# Patient Record
Sex: MALE | Race: WHITE | Employment: OTHER | URBAN - METROPOLITAN AREA
[De-identification: names, ages, dates, MRNs, and addresses within clinical notes are randomized per-mention and may not be internally consistent; named-entity substitution may affect disease eponyms.]

---

## 2017-03-22 ENCOUNTER — OFFICE VISIT (OUTPATIENT)
Dept: PODIATRY | Facility: CLINIC | Age: 82
End: 2017-03-22
Payer: MEDICARE

## 2017-03-22 VITALS — HEART RATE: 78 BPM | HEIGHT: 68 IN | WEIGHT: 192 LBS | BODY MASS INDEX: 29.1 KG/M2

## 2017-03-22 DIAGNOSIS — M79.671 PAIN IN BOTH FEET: Primary | ICD-10-CM

## 2017-03-22 DIAGNOSIS — M20.41 HAMMER TOES OF BOTH FEET: ICD-10-CM

## 2017-03-22 DIAGNOSIS — L60.0 INGROWN NAIL OF GREAT TOE OF RIGHT FOOT: ICD-10-CM

## 2017-03-22 DIAGNOSIS — M20.42 HAMMER TOES OF BOTH FEET: ICD-10-CM

## 2017-03-22 DIAGNOSIS — L60.0 INGROWING NAIL, LEFT GREAT TOE: ICD-10-CM

## 2017-03-22 DIAGNOSIS — I73.9 PVD (PERIPHERAL VASCULAR DISEASE) (H): ICD-10-CM

## 2017-03-22 DIAGNOSIS — M79.672 PAIN IN BOTH FEET: Primary | ICD-10-CM

## 2017-03-22 PROCEDURE — 11732 AVLSN NAIL PLATE SIMPLE EACH: CPT | Mod: T2 | Performed by: PODIATRIST

## 2017-03-22 PROCEDURE — 99203 OFFICE O/P NEW LOW 30 MIN: CPT | Mod: 25 | Performed by: PODIATRIST

## 2017-03-22 PROCEDURE — 11730 AVULSION NAIL PLATE SIMPLE 1: CPT | Mod: TA | Performed by: PODIATRIST

## 2017-03-22 RX ORDER — SILVER SULFADIAZINE 10 MG/G
CREAM TOPICAL 2 TIMES DAILY
Qty: 25 G | Refills: 0 | Status: SHIPPED | OUTPATIENT
Start: 2017-03-22 | End: 2017-07-11

## 2017-03-22 NOTE — LETTER
3/22/2017       RE: Eddie Stephens  [address]  [facility] [address]           Dear Colleague,    Thank you for referring your patient, Eddie Stephens, to the [facility]. Please see a copy of my visit note below.    PATIENT HISTORY:  Eddie Stephens is a 89 year old male who presents to clinic for painful ingrown nails both great toes. Notes he has them for years. Here with wife and daughter. Pain is 6/10. Denies fever, chills. Would like to know what can be done for them.     Review of Systems:  Patient denies fever, chills, rash, wound, stiffness, limping, numbness, weakness, heart burn, blood in stool, chest pain with activity, calf pain when walking, shortness of breath with activity, chronic cough, easy bleeding/bruising, swelling of ankles, excessive thirst, fatigue, depression, anxiety.      PAST MEDICAL HISTORY: No past medical history on file.     PAST SURGICAL HISTORY: No past surgical history on file.     MEDICATIONS:   Current Outpatient Prescriptions:      silver sulfADIAZINE (SILVADENE) 1 % cream, Apply topically 2 times daily Apply to toes 2x a day after foot soaks, Disp: 25 g, Rfl: 0     lidocaine (XYLOCAINE) 2 % topical gel, Apply topically as needed for moderate pain, Disp: 30 mL, Rfl: 0     beta carotene 40955 UNIT capsule, Take 10,000 Units by mouth daily, Disp: , Rfl:      Selenium 200 MCG CAPS, Take 1 capsule by mouth every morning, Disp: , Rfl:      MAGNESIUM GLYCINATE PLUS PO, , Disp: , Rfl:      Multiple Vitamins-Minerals (PRESERVISION AREDS) CAPS, , Disp: , Rfl:      Bilberry 60 MG CAPS, , Disp: , Rfl:      LUTEIN-ZEAXANTHIN PO, , Disp: , Rfl:      finasteride (PROSCAR) 5 MG tablet, Take 5 mg by mouth daily, Disp: , Rfl:      alfuzosin (UROXATRAL) 10 MG 24 hr tablet, Take 10 mg by mouth daily, Disp: , Rfl:      saw palmetto 450 MG CAPS capsule, Take 450 mg by mouth daily, Disp: , Rfl:      pramipexole (MIRAPEX) 0.125 MG tablet, Take 0.25 mg by mouth daily (with  "dinner), Disp: , Rfl:      Ferrous Gluconate 324 (37.5 FE) MG TABS, Take 1 tablet by mouth 2 times daily, Disp: , Rfl:      traMADol (ULTRAM) 50 MG tablet, Take by mouth At Bedtime, Disp: , Rfl:      Calcium Citrate-Vitamin D (CALCIUM CITRATE + D PO), Take by mouth 2 times daily, Disp: , Rfl:      flunisolide (NASALIDE) 25 MCG/ACT (0.025%) SOLN spray, Spray 2 sprays into both nostrils every 12 hours, Disp: , Rfl:      albuterol (PROAIR HFA/PROVENTIL HFA/VENTOLIN HFA) 108 (90 BASE) MCG/ACT Inhaler, Inhale 2 puffs into the lungs every 6 hours, Disp: , Rfl:      metroNIDAZOLE (METROGEL) 0.75 % topical gel, Apply topically 2 times daily, Disp: , Rfl:      minocycline (MINOCIN/DYNACIN) 50 MG capsule, Take 50 mg by mouth daily as needed, Disp: , Rfl:      ALLERGIES:    Allergies   Allergen Reactions     Ambien [Zolpidem]      Detrol [Tolterodine]      Erythromycin      Gabapentin      Keflex [Cephalexin]      Klonopin [Clonazepam]      Oxybutynin      Terazosin         SOCIAL HISTORY:   Social History     Social History     Marital status:      Spouse name: N/A     Number of children: N/A     Years of education: N/A     Occupational History     Not on file.     Social History Main Topics     Smoking status: Never Smoker     Smokeless tobacco: Not on file     Alcohol use Not on file     Drug use: Not on file     Sexual activity: Not on file     Other Topics Concern     Not on file     Social History Narrative        FAMILY HISTORY: No family history on file.     EXAM:Vitals: Pulse 78  Ht 1.727 m (5' 8\")  Wt 87.1 kg (192 lb)  BMI 29.19 kg/m2  BMI= Body mass index is 29.19 kg/(m^2).    General appearance: Patient is alert and fully cooperative with history & exam.  No sign of distress is noted during the visit.     Psychiatric: Affect is pleasant & appropriate.  Patient appears motivated to improve health.     Respiratory: Breathing is regular & unlabored while sitting.     HEENT: Hearing is intact to spoken word.  " Speech is clear.  No gross evidence of visual impairment that would impact ambulation.     Dermatologic: both borders of both great toenails are incurvated. Pain on palpation. Localized redness.      Vascular: DP & PT pulses are faintly palpable bilaterally.  No significant edema or varicosities noted.  CFT and skin temperature is normal to both lower extremities.     Neurologic: Lower extremity sensation is intact to light touch.  No evidence of weakness or contracture in the lower extremities.  No evidence of neuropathy.     Musculoskeletal: Patient is ambulatory without assistive device or brace.  Toes 2-5 are semi rigidly contractred.      ASSESSMENT:    Pain in both feet  Ingrowing nail, left great toe  Ingrown nail of great toe of right foot  PVD (peripheral vascular disease) (H)       PLAN:  Reviewed patient's chart in [facility]. The potential causes and nature of an ingrown toenail were discussed with the patient.  We reviewed the natural history/prognosis of the condition and potential risks if no treatment is provided.      Treatment options discussed included conservative management (oral antibiotics, soaking of foot, adequate width shoes)  as well as surgical management (partial or total nail removal).  The pros and cons of both forms of treatment were reviewed.      After thorough discussion and answering all questions, the patient elected to have the borders removed. Will soak the foot 2x a day for 2 weeks and apply antibiotic ointment and a bandage. Discussed that given his fainter pulses, would not recommend the permanent procedure as I do no want to cause a nonhealing wound.     Procedure 1: After verbal consent, the right big toe was anesthetized with 5cc's of 1% lidocaine plain. A tourniquet was applied to the toe. The medial and lateral borders were then raised from the nail bed and then cut the length of the nail.  The offending nail borders were then removed.    Bacitracin was applied to the nail  bed.  The tourniquet was removed.  Bandage was applied to the toe.  The patient tolerated the procedure and anesthesia well.       Procedure 2 : same procedure on left great toe.     Kami Chen DPM, Podiatry/Foot and Ankle Surgery    Weight management plan: Patient was referred to their PCP to discuss a diet and exercise plan.      Again, thank you for allowing me to participate in the care of your patient.        Sincerely,    Kami Chen DPM, Podiatry/Foot and Ankle Surgery

## 2017-03-22 NOTE — PROGRESS NOTES
PATIENT HISTORY:  Eddie Stephens is a 89 year old male who presents to clinic for painful ingrown nails both great toes. Notes he has them for years. Here with wife and daughter. Pain is 6/10. Denies fever, chills. Would like to know what can be done for them.     Review of Systems:  Patient denies fever, chills, rash, wound, stiffness, limping, numbness, weakness, heart burn, blood in stool, chest pain with activity, calf pain when walking, shortness of breath with activity, chronic cough, easy bleeding/bruising, swelling of ankles, excessive thirst, fatigue, depression, anxiety.      PAST MEDICAL HISTORY: No past medical history on file.     PAST SURGICAL HISTORY: No past surgical history on file.     MEDICATIONS:   Current Outpatient Prescriptions:      silver sulfADIAZINE (SILVADENE) 1 % cream, Apply topically 2 times daily Apply to toes 2x a day after foot soaks, Disp: 25 g, Rfl: 0     lidocaine (XYLOCAINE) 2 % topical gel, Apply topically as needed for moderate pain, Disp: 30 mL, Rfl: 0     beta carotene 90406 UNIT capsule, Take 10,000 Units by mouth daily, Disp: , Rfl:      Selenium 200 MCG CAPS, Take 1 capsule by mouth every morning, Disp: , Rfl:      MAGNESIUM GLYCINATE PLUS PO, , Disp: , Rfl:      Multiple Vitamins-Minerals (PRESERVISION AREDS) CAPS, , Disp: , Rfl:      Bilberry 60 MG CAPS, , Disp: , Rfl:      LUTEIN-ZEAXANTHIN PO, , Disp: , Rfl:      finasteride (PROSCAR) 5 MG tablet, Take 5 mg by mouth daily, Disp: , Rfl:      alfuzosin (UROXATRAL) 10 MG 24 hr tablet, Take 10 mg by mouth daily, Disp: , Rfl:      saw palmetto 450 MG CAPS capsule, Take 450 mg by mouth daily, Disp: , Rfl:      pramipexole (MIRAPEX) 0.125 MG tablet, Take 0.25 mg by mouth daily (with dinner), Disp: , Rfl:      Ferrous Gluconate 324 (37.5 FE) MG TABS, Take 1 tablet by mouth 2 times daily, Disp: , Rfl:      traMADol (ULTRAM) 50 MG tablet, Take by mouth At Bedtime, Disp: , Rfl:      Calcium Citrate-Vitamin D (CALCIUM CITRATE + D  "PO), Take by mouth 2 times daily, Disp: , Rfl:      flunisolide (NASALIDE) 25 MCG/ACT (0.025%) SOLN spray, Spray 2 sprays into both nostrils every 12 hours, Disp: , Rfl:      albuterol (PROAIR HFA/PROVENTIL HFA/VENTOLIN HFA) 108 (90 BASE) MCG/ACT Inhaler, Inhale 2 puffs into the lungs every 6 hours, Disp: , Rfl:      metroNIDAZOLE (METROGEL) 0.75 % topical gel, Apply topically 2 times daily, Disp: , Rfl:      minocycline (MINOCIN/DYNACIN) 50 MG capsule, Take 50 mg by mouth daily as needed, Disp: , Rfl:      ALLERGIES:    Allergies   Allergen Reactions     Ambien [Zolpidem]      Detrol [Tolterodine]      Erythromycin      Gabapentin      Keflex [Cephalexin]      Klonopin [Clonazepam]      Oxybutynin      Terazosin         SOCIAL HISTORY:   Social History     Social History     Marital status:      Spouse name: N/A     Number of children: N/A     Years of education: N/A     Occupational History     Not on file.     Social History Main Topics     Smoking status: Never Smoker     Smokeless tobacco: Not on file     Alcohol use Not on file     Drug use: Not on file     Sexual activity: Not on file     Other Topics Concern     Not on file     Social History Narrative        FAMILY HISTORY: No family history on file.     EXAM:Vitals: Pulse 78  Ht 1.727 m (5' 8\")  Wt 87.1 kg (192 lb)  BMI 29.19 kg/m2  BMI= Body mass index is 29.19 kg/(m^2).    General appearance: Patient is alert and fully cooperative with history & exam.  No sign of distress is noted during the visit.     Psychiatric: Affect is pleasant & appropriate.  Patient appears motivated to improve health.     Respiratory: Breathing is regular & unlabored while sitting.     HEENT: Hearing is intact to spoken word.  Speech is clear.  No gross evidence of visual impairment that would impact ambulation.     Dermatologic: both borders of both great toenails are incurvated. Pain on palpation. Localized redness.      Vascular: DP & PT pulses are faintly palpable " bilaterally.  No significant edema or varicosities noted.  CFT and skin temperature is normal to both lower extremities.     Neurologic: Lower extremity sensation is intact to light touch.  No evidence of weakness or contracture in the lower extremities.  No evidence of neuropathy.     Musculoskeletal: Patient is ambulatory without assistive device or brace.  Toes 2-5 are semi rigidly contractred.      ASSESSMENT:    Pain in both feet  Ingrowing nail, left great toe  Ingrown nail of great toe of right foot  PVD (peripheral vascular disease) (H)       PLAN:  Reviewed patient's chart in [facility]. The potential causes and nature of an ingrown toenail were discussed with the patient.  We reviewed the natural history/prognosis of the condition and potential risks if no treatment is provided.      Treatment options discussed included conservative management (oral antibiotics, soaking of foot, adequate width shoes)  as well as surgical management (partial or total nail removal).  The pros and cons of both forms of treatment were reviewed.      After thorough discussion and answering all questions, the patient elected to have the borders removed. Will soak the foot 2x a day for 2 weeks and apply antibiotic ointment and a bandage. Discussed that given his fainter pulses, would not recommend the permanent procedure as I do no want to cause a nonhealing wound.     Procedure 1: After verbal consent, the right big toe was anesthetized with 5cc's of 1% lidocaine plain. A tourniquet was applied to the toe. The medial and lateral borders were then raised from the nail bed and then cut the length of the nail.  The offending nail borders were then removed.    Bacitracin was applied to the nail bed.  The tourniquet was removed.  Bandage was applied to the toe.  The patient tolerated the procedure and anesthesia well.       Procedure 2 : same procedure on left great toe.     Kami Chen DPM, Podiatry/Foot and Ankle Surgery    Weight  management plan: Patient was referred to their PCP to discuss a diet and exercise plan.

## 2017-03-22 NOTE — NURSING NOTE
"Chief Complaint   Patient presents with     Ingrown Toenail     Bl hallux nails, a podiatrist came to his home and it has hurt since       Initial Pulse 78  Ht 5' 8\" (1.727 m)  Wt 192 lb (87.1 kg)  BMI 29.19 kg/m2 Estimated body mass index is 29.19 kg/(m^2) as calculated from the following:    Height as of this encounter: 5' 8\" (1.727 m).    Weight as of this encounter: 192 lb (87.1 kg).  Medication Reconciliation: complete  "

## 2017-03-22 NOTE — PATIENT INSTRUCTIONS
DR. BUCIO'S CLINIC SCHEDULE     [facility]  [address] [place] [address]  P: [phone]  F: [phone] [facility]  [address] [place] [address]  P: [phone]  F: [phone] [facility]  [address] [place] [address]  P: [phone]  F: [phone]   FRIDAY AM FRIDAY PM SURGERY   [facility]     [facility]  [address]  [facility] [place] [address]  P: [phone] [facility]  [address]  [place] [address]  P: [phone]  F: [phone] Surgery [phone]   Appointment [phone] General After Hours:  [phone] Patient [phone]     INGROWN TOENAILS  When a toenail is ingrown, it is curved and grows into the skin, usually at the nail borders (the sides of the nail). This  digging in  of the nail irritates the skin, often creating pain, redness, swelling, and warmth in the toe.  If an ingrown nail causes a break in the skin, bacteria may enter and cause an infection in the area, which is often marked by drainage and a foul odor. However, even if the toe isn t painful, red, swollen, or warm, a nail that curves downward into the skin can progress to an infection.  CAUSES:  Heredity: In many people, the tendency for ingrown toenails is inherited.   Trauma: Sometimes an ingrown toenail is the result of trauma, such as stubbing your toe, having an object fall on your toe, or engaging in activities that involve repeated pressure on the toes, such as kicking or running.   Improper Trimming:  The most common cause of ingrown toenails is cutting your nails too short. This encourages the skin next to the nail to fold over the nail.   Improperly Sized Footwear: Ingrown toenails can result from wearing socks and shoes that are tight or short.   Nail Conditions: Ingrown toenails can be caused by nail problems, such as  fungal infections or losing a nail due to trauma.   TREATMENT: Sometimes initial treatment for ingrown toenails can be safely performed at home. However, home treatment is strongly discouraged if an infection is suspected, or for those who have medical conditions that put feet at high risk, such as diabetes, nerve damage in the foot, or poor circulation.  Home care: If you don t have an infection or any of the above medical conditions, you can soak your foot in room-temperature water (adding Epsom s salt may be recommended by your doctor), and gently massage the side of the nail fold to help reduce the inflammation.  Avoid attempting  bathroom surgery.  Repeated cutting of the nail can cause the condition to worsen over time. If your symptoms fail to improve, it s time to see a foot and ankle surgeon.  Physician care: After examining the toe, the foot and ankle surgeon will select the treatment best suited for you. If an infection is present, an oral antibiotic may be prescribed.  Sometimes a minor surgical procedure, often performed in the office, will ease the pain and remove the offending nail. After applying a local anesthetic, the doctor removes part of the nail s side border. Some nails may become ingrown again, requiring removal of the nail root.  Following the nail procedure, a light bandage will be applied. Most people experience very little pain after surgery and may resume normal activity the next day. If your surgeon has prescribed an oral antibiotic, be sure to take all the medication, even if your symptoms have improved.  PREVENTION:  Proper Trimming: Cut toenails in a fairly straight line, and don t cut them too short. You should be able to get your fingernail under the sides and end of the nail.   Well-fitting Footwear: Don t wear shoes that are short or tight in the toe area. Avoid shoes that are loose, because they too cause pressure on the toes, especially when running or walking briskly.      INGROWN TOENAIL POSTOP CARE    Go directly home and elevate the affected foot on one or two pillows for the remainder of the day/evening if possible. Your toe may stay numb anywhere from 2-8 hours.     Take Tylenol, ibuprofen or another anti-inflammatory as needed for pain.     Take antibiotic if that has been prescribed. Finish the entire prescribed antibiotic even if your symptoms have improved.     The evening of the procedure, soak/wash the affected area in warm water (you may add Epsom salt) for 5 to 10 minutes. Do this twice a day for 2-4 weeks (6-8 weeks if you had phenol) (you may count showering/bathing as one soak).  After soaks, pat the area dry and then allow to airdry for a few minutes. Apply antibiotic ointment to the area and cover with 2 X 2 gauze and paper tape or band-aid.    You may pursue everyday activities as tolerated with either an open toe shoe or cut-out shoe as needed or you may wear regular shoes if no pain is noted.    Watch for any signs and symptoms of infection such as: redness, red streaks going up the foot/leg, swelling, pus or foul odor. Those that have had the phenol procedure, the toe will drain longer and will look like it is infected because it is a chemical burn.      Please call with questions.      FOOT CARE NURSES  If you are interested in having a foot care nurse come out to your   home, please call one of these contacts for more information:  [facility]  [phone] [facility]  [phone]   [facility]  [phone]  [place] [phone]  [place]   [facility]  [phone]  At [facility] [phone]           Body Mass Index (BMI)  Many things can cause foot and ankle problems. Foot structure, activity level, foot mechanics and injuries are common causes of pain.    One very important issue that often goes unmentioned, is body weight.  Extra weight can cause increased  stress on muscles, ligaments, bones and tendons.  Sometimes just a few extra pounds is all it takes to put one over her/his threshold.   Without reducing that stress, it can be difficult to alleviate pain.      Some people are uncomfortable addressing this issue, but we feel it is important for you to think about it.  As Foot &  Ankle specialists, our job is addressing the lower extremity problem and possible causes.     Regarding extra body weight, we encourage patients to discuss diet and weight management plans with their primary care doctors.  It is this team approach that gives you the best opportunity for pain relief and getting you back on your feet.

## 2017-03-22 NOTE — MR AVS SNAPSHOT
After Visit Summary   3/22/2017    Eddie Stephens    MRN: [id number]           Patient Information     Date Of Birth          1922        Visit Information        Provider Department      3/22/2017 10:30 AM Kami Chen DPM, Podiatry/Foot and Ankle Surgery [facility]        Today's Diagnoses     Pain in both feet    -  1    Ingrowing nail, left great toe        Ingrown nail of great toe of right foot          Care Instructions    DR. CHEN'S CLINIC SCHEDULE     [facility]  [address] [place] [address]  P: [phone]  F: [phone] [facility]  [address] [place] [address]  P: [phone]  F: [phone] [facility]  [address] [place] [address]  P: [phone]  F: [phone]   FRIDAY AM FRIDAY PM SURGERY   [facility]     [facility]  [address]  [place] [address]  P: [phone] [facility]  [address]  [place] [address]  P: [phone]  F: [phone] Surgery [phone]   Appointment [phone] General After Hours:  [phone] Patient [phone]     INGROWN TOENAILS  When a toenail is ingrown, it is curved and grows into the skin, usually at the nail borders (the sides of the nail). This  digging in  of the nail irritates the skin, often creating pain, redness, swelling, and warmth in the toe.  If an ingrown nail causes a break in the skin, bacteria may enter and cause an infection in the area, which is often marked by drainage and a foul odor. However, even if the toe isn t painful, red, swollen, or warm, a nail that curves downward into the skin can progress to an infection.  CAUSES:  Heredity: In many people, the tendency for ingrown toenails is inherited.   Trauma: Sometimes an ingrown toenail is the result of trauma, such as stubbing your toe, having an  object fall on your toe, or engaging in activities that involve repeated pressure on the toes, such as kicking or running.   Improper Trimming:  The most common cause of ingrown toenails is cutting your nails too short. This encourages the skin next to the nail to fold over the nail.   Improperly Sized Footwear: Ingrown toenails can result from wearing socks and shoes that are tight or short.   Nail Conditions: Ingrown toenails can be caused by nail problems, such as fungal infections or losing a nail due to trauma.   TREATMENT: Sometimes initial treatment for ingrown toenails can be safely performed at home. However, home treatment is strongly discouraged if an infection is suspected, or for those who have medical conditions that put feet at high risk, such as diabetes, nerve damage in the foot, or poor circulation.  Home care: If you don t have an infection or any of the above medical conditions, you can soak your foot in room-temperature water (adding Epsom s salt may be recommended by your doctor), and gently massage the side of the nail fold to help reduce the inflammation.  Avoid attempting  bathroom surgery.  Repeated cutting of the nail can cause the condition to worsen over time. If your symptoms fail to improve, it s time to see a foot and ankle surgeon.  Physician care: After examining the toe, the foot and ankle surgeon will select the treatment best suited for you. If an infection is present, an oral antibiotic may be prescribed.  Sometimes a minor surgical procedure, often performed in the office, will ease the pain and remove the offending nail. After applying a local anesthetic, the doctor removes part of the nail s side border. Some nails may become ingrown again, requiring removal of the nail root.  Following the nail procedure, a light bandage will be applied. Most people experience very little pain after surgery and may resume normal activity the next day. If your surgeon has prescribed an oral  antibiotic, be sure to take all the medication, even if your symptoms have improved.  PREVENTION:  Proper Trimming: Cut toenails in a fairly straight line, and don t cut them too short. You should be able to get your fingernail under the sides and end of the nail.   Well-fitting Footwear: Don t wear shoes that are short or tight in the toe area. Avoid shoes that are loose, because they too cause pressure on the toes, especially when running or walking briskly.     INGROWN TOENAIL POSTOP CARE    Go directly home and elevate the affected foot on one or two pillows for the remainder of the day/evening if possible. Your toe may stay numb anywhere from 2-8 hours.     Take Tylenol, ibuprofen or another anti-inflammatory as needed for pain.     Take antibiotic if that has been prescribed. Finish the entire prescribed antibiotic even if your symptoms have improved.     The evening of the procedure, soak/wash the affected area in warm water (you may add Epsom salt) for 5 to 10 minutes. Do this twice a day for 2-4 weeks (6-8 weeks if you had phenol) (you may count showering/bathing as one soak).  After soaks, pat the area dry and then allow to airdry for a few minutes. Apply antibiotic ointment to the area and cover with 2 X 2 gauze and paper tape or band-aid.    You may pursue everyday activities as tolerated with either an open toe shoe or cut-out shoe as needed or you may wear regular shoes if no pain is noted.    Watch for any signs and symptoms of infection such as: redness, red streaks going up the foot/leg, swelling, pus or foul odor. Those that have had the phenol procedure, the toe will drain longer and will look like it is infected because it is a chemical burn.      Please call with questions.      FOOT CARE NURSES  If you are interested in having a foot care nurse come out to your   home, please call one of these contacts for more information:  [facility]  [phone] [facility]  [phone]    [facility]  [phone]  [facility] [phone]  [facility]  [phone]  At [facility] [phone]           Body Mass Index (BMI)  Many things can cause foot and ankle problems. Foot structure, activity level, foot mechanics and injuries are common causes of pain.    One very important issue that often goes unmentioned, is body weight.  Extra weight can cause increased stress on muscles, ligaments, bones and tendons.  Sometimes just a few extra pounds is all it takes to put one over her/his threshold.   Without reducing that stress, it can be difficult to alleviate pain.      Some people are uncomfortable addressing this issue, but we feel it is important for you to think about it.  As Foot &  Ankle specialists, our job is addressing the lower extremity problem and possible causes.     Regarding extra body weight, we encourage patients to discuss diet and weight management plans with their primary care doctors.  It is this team approach that gives you the best opportunity for pain relief and getting you back on your feet.                Follow-ups after your visit        Who to contact     If you have questions or need follow up information about today's clinic visit or your schedule please contact [facility] directly at [phone].  Normal or non-critical lab and imaging results will be communicated to you by MyChart, letter or phone within 4 business days after the clinic has received the results. If you do not hear from us within 7 days, please contact the clinic through [facility] or phone. If you have a critical or abnormal lab result, we will notify you by phone as soon as possible.  Submit refill requests through [facility] or call your pharmacy and they will forward the refill request to us. Please allow 3 business days for your refill to be completed.          Additional Information About  "Your Visit        MyChart Information     [facility] lets you send messages to your doctor, view your test results, renew your prescriptions, schedule appointments and more. To sign up, go to [email or web address] . Click on \"Log in\" on the left side of the screen, which will take you to the Welcome page. Then click on \"Sign up Now\" on the right side of the page.     You will be asked to enter the access code listed below, as well as some personal information. Please follow the directions to create your username and password.     Your access code is: [email or web address]  Expires: 2017 10:58 AM     Your access code will  in 90 days. If you need help or a new code, please call your [facility] clinic or [phone].        Care EveryWhere ID     This is your Care EveryWhere ID. This could be used by other organizations to access your [facility] medical records  [id number]        Your Vitals Were     Pulse Height BMI (Body Mass Index)             78 1.727 m (5' 8\") 29.19 kg/m2          Blood Pressure from Last 3 Encounters:   16 110/70    Weight from Last 3 Encounters:   17 87.1 kg (192 lb)   16 85.3 kg (188 lb)              Today, you had the following     No orders found for display       Primary Care Provider Office Phone # Fax #    Thom Cruz PA-C [phone]       [facility] [address]  [facility] [address]        Thank you!     Thank you for choosing [facility]  for your care. Our goal is always to provide you with excellent care. Hearing back from our patients is one way we can continue to improve our services. Please take a few minutes to complete the written survey that you may receive in the mail after your visit with us. Thank you!             Your Updated Medication List - Protect others around you: Learn how to safely use, store and throw away your medicines at www.disposemymeds.org.          This list is accurate as of: " 3/22/17 11:20 AM.  Always use your most recent med list.                   Brand Name Dispense Instructions for use    albuterol 108 (90 BASE) MCG/ACT Inhaler    PROAIR HFA/PROVENTIL HFA/VENTOLIN HFA     Inhale 2 puffs into the lungs every 6 hours       alfuzosin 10 MG 24 hr tablet    UROXATRAL     Take 10 mg by mouth daily       beta carotene 81622 UNIT capsule      Take 10,000 Units by mouth daily       Bilberry 60 MG Caps          CALCIUM CITRATE + D PO      Take by mouth 2 times daily       Ferrous Gluconate 324 (37.5 FE) MG Tabs      Take 1 tablet by mouth 2 times daily       finasteride 5 MG tablet    PROSCAR     Take 5 mg by mouth daily       flunisolide 25 MCG/ACT (0.025%) Soln spray    NASALIDE     Spray 2 sprays into both nostrils every 12 hours       LUTEIN-ZEAXANTHIN PO          MAGNESIUM GLYCINATE PLUS PO          metroNIDAZOLE 0.75 % topical gel    METROGEL     Apply topically 2 times daily       minocycline 50 MG capsule    MINOCIN/DYNACIN     Take 50 mg by mouth daily as needed       MIRAPEX 0.125 MG tablet   Generic drug:  pramipexole      Take 0.25 mg by mouth daily (with dinner)       PRESERVISION AREDS Caps          saw palmetto 450 MG Caps capsule      Take 450 mg by mouth daily       Selenium 200 MCG Caps      Take 1 capsule by mouth every morning       traMADol 50 MG tablet    ULTRAM     Take by mouth At Bedtime

## 2017-07-11 ENCOUNTER — OFFICE VISIT (OUTPATIENT)
Dept: FAMILY MEDICINE | Facility: CLINIC | Age: 82
End: 2017-07-11
Payer: MEDICARE

## 2017-07-11 VITALS
HEART RATE: 73 BPM | OXYGEN SATURATION: 93 % | SYSTOLIC BLOOD PRESSURE: 118 MMHG | TEMPERATURE: 98.1 F | WEIGHT: 198.8 LBS | BODY MASS INDEX: 30.13 KG/M2 | DIASTOLIC BLOOD PRESSURE: 66 MMHG | HEIGHT: 68 IN

## 2017-07-11 DIAGNOSIS — N40.1 BENIGN PROSTATIC HYPERPLASIA WITH URINARY FREQUENCY: ICD-10-CM

## 2017-07-11 DIAGNOSIS — R35.0 BENIGN PROSTATIC HYPERPLASIA WITH URINARY FREQUENCY: ICD-10-CM

## 2017-07-11 DIAGNOSIS — M19.011 PRIMARY OSTEOARTHRITIS OF BOTH SHOULDERS: Primary | ICD-10-CM

## 2017-07-11 DIAGNOSIS — M19.012 PRIMARY OSTEOARTHRITIS OF BOTH SHOULDERS: Primary | ICD-10-CM

## 2017-07-11 PROCEDURE — 99203 OFFICE O/P NEW LOW 30 MIN: CPT | Performed by: PHYSICIAN ASSISTANT

## 2017-07-11 NOTE — PROGRESS NOTES
"  SUBJECTIVE:                                                    Eddie Stephens is a 89 year old male who presents to clinic today for the following health issues:    This gentleman is a 89yo new to this clinic and [facility]  He is establishing care but would like to discuss two specific concerns today    SHOULDER  Would like to discuss when/where they can go to get cortisone injections in left shoulder.  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   He is having frequent urination at night - about every 2 hours.   Does have a hx of BPH and has seen urology previously, not for a few years  The nocturia has been a problem for the past few years  Is using CPAP  Drinks one to two cups coffee daily (decaf); no etoh, monitors other liquid intake    [facility]      Problem list and histories reviewed & adjusted, as indicated.  Additional history: as documented    Patient Active Problem List   Diagnosis   (none) - all problems resolved or deleted     History reviewed. No pertinent surgical history.    Social History   Substance Use Topics     Smoking status: Former Smoker     Smokeless tobacco: Not on file     Alcohol use Not on file     Family History   Problem Relation Age of Onset     Coronary Artery Disease Mother      Hypertension Mother            Reviewed and updated as needed this visit by clinical staff       Reviewed and updated as needed this visit by Provider         ROS:  Constitutional, HEENT, cardiovascular, pulmonary, gi and gu systems are negative, except as otherwise noted.    OBJECTIVE:     BP " "118/66  Pulse 73  Temp 98.1  F (36.7  C) (Oral)  Ht 5' 8\" (1.727 m)  Wt 198 lb 12.8 oz (90.2 kg)  SpO2 93%  BMI 30.23 kg/m2  Body mass index is 30.23 kg/(m^2).  GENERAL: healthy, alert and no distress  RESP: lungs clear to auscultation - no rales, rhonchi or wheezes  CV: regular rates and rhythm  RECTAL/ (male): deferred today  MS: Left shoulder shows limited ROM 2/2 pain, especially overhead/above 90    Diagnostic Test Results:  none     ASSESSMENT/PLAN:   1. Primary osteoarthritis of both shoulders  Trial of voltaren and will have him see our ortho group for consideration of contisone injections  - diclofenac (VOLTAREN) 1 % GEL topical gel; Apply ~ 4 grams to shoulders four times daily using enclosed dosing card.  Dispense: 100 g; Refill: 1  - ORTHO  REFERRAL    2. Benign prostatic hyperplasia with urinary frequency  Unclear really what therapies Tyshawn would have remaining. He seems to be quite sensitive to medications and has had significant side effects with antispasmodics and anticholinergics. Will have him set up with the folks over at [facility] to discuss.   - UROLOGY ADULT REFERRAL    **Plan to update his chart with more info once records received.    Thom Cruz PA-C  [facility]    "

## 2017-07-11 NOTE — MR AVS SNAPSHOT
After Visit Summary   7/11/2017    Eddie Stephens    MRN: [id number]           Patient Information     Date Of Birth          6/25/1922        Visit Information        Provider Department      7/11/2017 1:40 PM Thom Cruz PA-C [facility]        Today's Diagnoses     Primary osteoarthritis of both shoulders    -  1    Benign prostatic hyperplasia with urinary frequency           Follow-ups after your visit        Additional Services     ORTHO  REFERRAL       Coverage of these services is subject to the terms and limitations of your health insurance plan. Please call member services at your health plan with any benefit or coverage questions.       [facility] is referring you to the Orthopedic  Services at [facility].       The  representative will assist you in the coordination of your orthopedic and musculoskeletal care as prescribed by your physician.    The  representative will call you within 24 hours or   You may contact the  representative at:   [phone] for [facility]  [phone] for [facility], and [facility]  [phone] for [facility]    Type of Referral : Non Surgical       Timeframe requested: Routine       If X-rays, CT or MRI's   have been performed, please contact the facility where they were done, to arrange for  prior to your scheduled appointment. Please bring this referral request to your appointment and present it to your specialist.            UROLOGY ADULT REFERRAL       Your provider has referred you to: [facility]: [facility] [phone]   [email or web address]    Please be aware that coverage of these services is subject to the terms and limitations of your health insurance plan.  Call member services at your health plan with any benefit or coverage questions.      Please bring the following with  "you to your appointment:    (1) Any X-Rays, CTs or MRIs which have been performed.  Contact the facility where they were done to arrange for  prior to your scheduled appointment.    (2) List of current medications  (3) This referral request   (4) Any documents/labs given to you for this referral                  Who to contact     If you have questions or need follow up information about today's clinic visit or your schedule please contact [facility] directly at [phone].  Normal or non-critical lab and imaging results will be communicated to you by [facility], letter or phone within 4 business days after the clinic has received the results. If you do not hear from us within 7 days, please contact the clinic through [facility] or phone. If you have a critical or abnormal lab result, we will notify you by phone as soon as possible.  Submit refill requests through [facility] or call your pharmacy and they will forward the refill request to us. Please allow 3 business days for your refill to be completed.          Additional Information About Your Visit        [facility]Lives Information     [facility] gives you secure access to your electronic health record. If you see a primary care provider, you can also send messages to your care team and make appointments. If you have questions, please call your primary care clinic.  If you do not have a primary care provider, please call [phone] and they will assist you.        Care EveryWhere ID     This is your Care EveryWhere ID. This could be used by other organizations to access your [facility] medical records  [id number]        Your Vitals Were     Pulse Temperature Height Pulse Oximetry BMI (Body Mass Index)       73 98.1  F (36.7  C) (Oral) 5' 8\" (1.727 m) 93% 30.23 kg/m2        Blood Pressure from Last 3 Encounters:   07/11/17 118/66   12/03/16 110/70    Weight from Last 3 Encounters:   07/11/17 198 lb 12.8 oz (90.2 kg)   03/22/17 192 lb (87.1 kg)   12/03/16 " 188 lb (85.3 kg)              We Performed the Following     ORTHO  REFERRAL     UROLOGY ADULT REFERRAL          Today's Medication Changes          These changes are accurate as of: 7/11/17  2:15 PM.  If you have any questions, ask your nurse or doctor.               Start taking these medicines.        Dose/Directions    diclofenac 1 % Gel topical gel   Commonly known as:  VOLTAREN   Used for:  Primary osteoarthritis of both shoulders   Started by:  Thom Cruz PA-C        Apply ~ 4 grams to shoulders four times daily using enclosed dosing card.   Quantity:  100 g   Refills:  1            Where to get your medicines      These medications were sent to [facility] [address] [place], [place] [address] [address] [place] [address]     Phone:  [phone]     diclofenac 1 % Gel topical gel                Primary Care Provider Office Phone # Fax #    Thom Cruz PA-C [phone]       [facility] [address] [facility] [address]        Equal Access to Services     ISAIAS MARY AH: Hadii aad ku hadasho Soomaali, waaxda luqadaha, qaybta kaalmada adeegyada, waxay idiin hayaan adeeg kharash la'aan . So [facility] [phone].    ATENCIÓN: Si habla español, tiene a salazar disposición servicios gratuitos de asistencia lingüística. [facility] [phone].    We comply with applicable federal civil rights laws and [place] laws. We do not discriminate on the basis of race, color, national origin, age, disability sex, sexual orientation or gender identity.            Thank you!     Thank you for choosing [facility]  for your care. Our goal is always to provide you with excellent care. Hearing back from our patients is one way we can continue to improve our services. Please take a few minutes to complete the written survey that you may receive in the mail after your visit with us. Thank you!             Your Updated Medication  List - Protect others around you: Learn how to safely use, store and throw away your medicines at www.disposemymeds.org.          This list is accurate as of: 7/11/17  2:15 PM.  Always use your most recent med list.                   Brand Name Dispense Instructions for use Diagnosis    albuterol 108 (90 BASE) MCG/ACT Inhaler    PROAIR HFA/PROVENTIL HFA/VENTOLIN HFA     Inhale 2 puffs into the lungs every 6 hours        alfuzosin 10 MG 24 hr tablet    UROXATRAL     Take 10 mg by mouth daily        beta carotene 30350 UNIT capsule      Take 10,000 Units by mouth daily        Bilberry 60 MG Caps           CALCIUM CITRATE + D PO      Take by mouth 2 times daily        diclofenac 1 % Gel topical gel    VOLTAREN    100 g    Apply ~ 4 grams to shoulders four times daily using enclosed dosing card.    Primary osteoarthritis of both shoulders       Ferrous Gluconate 324 (37.5 FE) MG Tabs      Take 1 tablet by mouth 2 times daily        finasteride 5 MG tablet    PROSCAR     Take 5 mg by mouth daily        flunisolide 25 MCG/ACT (0.025%) Soln spray    NASALIDE     Spray 2 sprays into both nostrils every 12 hours        lidocaine 2 % topical gel    XYLOCAINE    30 mL    Apply topically as needed for moderate pain    Pain in both feet, Ingrowing nail, left great toe, Ingrown nail of great toe of right foot, PVD (peripheral vascular disease) (H)       LUTEIN-ZEAXANTHIN PO           MAGNESIUM GLYCINATE PLUS PO           metroNIDAZOLE 0.75 % topical gel    METROGEL     Apply topically 2 times daily        minocycline 50 MG capsule    MINOCIN/DYNACIN     Take 50 mg by mouth daily as needed        MIRAPEX 0.125 MG tablet   Generic drug:  pramipexole      Take 0.25 mg by mouth daily (with dinner)        PRESERVISION AREDS Caps           saw palmetto 450 MG Caps capsule      Take 540 mg by mouth daily        Selenium 200 MCG Caps      Take 1 capsule by mouth every morning        traMADol 50 MG tablet    ULTRAM     Take by mouth At  Bedtime

## 2017-07-11 NOTE — NURSING NOTE
"Chief Complaint   Patient presents with     Consult       Initial /66  Pulse 73  Temp 98.1  F (36.7  C) (Oral)  Ht 5' 8\" (1.727 m)  Wt 198 lb 12.8 oz (90.2 kg)  SpO2 93%  BMI 30.23 kg/m2 Estimated body mass index is 30.23 kg/(m^2) as calculated from the following:    Height as of this encounter: 5' 8\" (1.727 m).    Weight as of this encounter: 198 lb 12.8 oz (90.2 kg).  Medication Reconciliation: complete   Adeel Feliciano CMA        "

## 2017-07-12 ENCOUNTER — TRANSFERRED RECORDS (OUTPATIENT)
Dept: HEALTH INFORMATION MANAGEMENT | Facility: CLINIC | Age: 82
End: 2017-07-12

## 2017-07-14 PROBLEM — G47.00 INSOMNIA: Status: ACTIVE | Noted: 2017-07-14

## 2017-07-14 PROBLEM — C44.622 SCC (SQUAMOUS CELL CARCINOMA), ARM, RIGHT: Status: ACTIVE | Noted: 2017-07-14

## 2017-07-14 PROBLEM — Z98.49 HISTORY OF CATARACT EXTRACTION: Status: ACTIVE | Noted: 2017-07-14

## 2017-07-14 PROBLEM — H35.30 AGE-RELATED MACULAR DEGENERATION: Status: ACTIVE | Noted: 2017-07-14

## 2017-07-14 PROBLEM — N40.1 BENIGN LOCALIZED HYPERPLASIA OF PROSTATE WITH URINARY OBSTRUCTION AND OTHER LOWER URINARY TRACT SYMPTOMS (LUTS): Status: ACTIVE | Noted: 2017-07-14

## 2017-07-14 PROBLEM — H91.90 HEARING LOSS: Status: ACTIVE | Noted: 2017-07-14

## 2017-07-14 PROBLEM — K21.9 GASTROESOPHAGEAL REFLUX DISEASE: Status: ACTIVE | Noted: 2017-07-14

## 2017-07-14 PROBLEM — G25.81 RESTLESS LEGS SYNDROME: Status: ACTIVE | Noted: 2017-07-14

## 2017-07-14 PROBLEM — Z98.890 OTHER POSTPROCEDURAL STATES: Status: ACTIVE | Noted: 2017-07-14

## 2017-07-14 PROBLEM — Z96.619 SHOULDER JOINT REPLACED BY OTHER MEANS: Status: ACTIVE | Noted: 2017-07-14

## 2017-07-14 PROBLEM — E78.00 PURE HYPERCHOLESTEROLEMIA: Status: ACTIVE | Noted: 2017-07-14

## 2017-07-14 PROBLEM — G47.33 OBSTRUCTIVE SLEEP APNEA SYNDROME: Status: ACTIVE | Noted: 2017-07-14

## 2017-07-17 ENCOUNTER — RADIANT APPOINTMENT (OUTPATIENT)
Dept: GENERAL RADIOLOGY | Facility: CLINIC | Age: 82
End: 2017-07-17
Attending: FAMILY MEDICINE
Payer: MEDICARE

## 2017-07-17 ENCOUNTER — DOCUMENTATION ONLY (OUTPATIENT)
Dept: FAMILY MEDICINE | Facility: CLINIC | Age: 82
End: 2017-07-17

## 2017-07-17 ENCOUNTER — OFFICE VISIT (OUTPATIENT)
Dept: ORTHOPEDICS | Facility: CLINIC | Age: 82
End: 2017-07-17
Payer: MEDICARE

## 2017-07-17 VITALS
BODY MASS INDEX: 30.01 KG/M2 | SYSTOLIC BLOOD PRESSURE: 130 MMHG | HEIGHT: 68 IN | DIASTOLIC BLOOD PRESSURE: 80 MMHG | WEIGHT: 198 LBS

## 2017-07-17 DIAGNOSIS — G89.29 CHRONIC PAIN OF BOTH SHOULDERS: Primary | ICD-10-CM

## 2017-07-17 DIAGNOSIS — M25.511 BILATERAL SHOULDER PAIN, UNSPECIFIED CHRONICITY: ICD-10-CM

## 2017-07-17 DIAGNOSIS — M25.511 CHRONIC PAIN OF BOTH SHOULDERS: Primary | ICD-10-CM

## 2017-07-17 DIAGNOSIS — M25.512 BILATERAL SHOULDER PAIN, UNSPECIFIED CHRONICITY: ICD-10-CM

## 2017-07-17 DIAGNOSIS — M25.512 CHRONIC PAIN OF BOTH SHOULDERS: Primary | ICD-10-CM

## 2017-07-17 DIAGNOSIS — M19.012 PRIMARY OSTEOARTHRITIS OF LEFT SHOULDER: ICD-10-CM

## 2017-07-17 DIAGNOSIS — Z96.611 STATUS POST TOTAL SHOULDER ARTHROPLASTY, RIGHT: ICD-10-CM

## 2017-07-17 PROCEDURE — 20611 DRAIN/INJ JOINT/BURSA W/US: CPT | Mod: LT | Performed by: FAMILY MEDICINE

## 2017-07-17 PROCEDURE — 73030 X-RAY EXAM OF SHOULDER: CPT | Mod: LT

## 2017-07-17 PROCEDURE — 99204 OFFICE O/P NEW MOD 45 MIN: CPT | Mod: 25 | Performed by: FAMILY MEDICINE

## 2017-07-17 NOTE — PATIENT INSTRUCTIONS
Thank you for allowing us to participate in your care today.  Please find below your visit diagnosis and the plan going forward.    1. Chronic pain of both shoulders    2. Primary osteoarthritis of left shoulder    3. Status post total shoulder arthroplasty, right      Activity modification as discussed  Physical therapy: [facility] - [phone]  Steroid injection of the left shoulder: intra-articular  was performed today in clinic  - Ok to shower  - No bathtub, hot tub or swimming for 2 days  - The lidocaine (what is giving you pain relief right now) will likely stop working in 1-2 hours.  You will then have pain again, similar to before you received the injection. The corticosteroid will not start working until approximately 1-2 weeks from now.  - Ice today and only do your normal amounts of activity  Rx provided for a lucinda brace. Will have to see if this provides relief.    Follow up as needed. Call direct clinic number [[phone]] at any time with questions or concerns.    Jair Cobian DO CAQSM  [facility]  Website: [email or web address]  Twitter: [email or web address]

## 2017-07-17 NOTE — MR AVS SNAPSHOT
After Visit Summary   7/17/2017    Eddie Stephens    MRN: [id number]           Patient Information     Date Of Birth          6/25/1922        Visit Information        Provider Department      7/17/2017 1:20 PM Jair Cobian DO [facility]        Today's Diagnoses     Chronic pain of both shoulders    -  1    Primary osteoarthritis of left shoulder        Status post total shoulder arthroplasty, right          Care Instructions    Thank you for allowing us to participate in your care today.  Please find below your visit diagnosis and the plan going forward.    1. Chronic pain of both shoulders    2. Primary osteoarthritis of left shoulder    3. Status post total shoulder arthroplasty, right      Activity modification as discussed  Physical therapy: [facility] - [phone]  Steroid injection of the left shoulder: intra-articular  was performed today in clinic  - Ok to shower  - No bathtub, hot tub or swimming for 2 days  - The lidocaine (what is giving you pain relief right now) will likely stop working in 1-2 hours.  You will then have pain again, similar to before you received the injection. The corticosteroid will not start working until approximately 1-2 weeks from now.  - Ice today and only do your normal amounts of activity  Rx provided for a lucinda brace. Will have to see if this provides relief.    Follow up as needed. Call direct clinic number [[phone]] at any time with questions or concerns.    Jair Cobian DO [facility]  Website: [email or web address]  Twitter: [email or web address]              Follow-ups after your visit        Additional Services     ORTHOTICS REFERRAL       **This referral order prints off in the [facility]  (Orthotics & Prosthetics) Central Scheduling Office**    The [facility] Scheduling Staff will contact the patient to schedule appointments.     Central  Scheduling Contact Information: [phone] ([place])    Orthotics: Coleman brace, Right shoulder    Please be aware that coverage of these services is subject to the terms and limitations of your health insurance plan.  Call member services at your health plan with any benefit or coverage questions.      Please bring the following to your appointment:    >>   Any x-rays, CTs or MRIs which have been performed.  Contact the facility where they were done to arrange for  prior to your scheduled appointment.    >>   List of current medications   >>   This referral request   >>   Any documents/labs given to you for this referral                  Your next 10 appointments already scheduled     Aug 21, 2017  1:50 PM CDT   New Patient Visit with Leonard Weiner MD   [facility] ([facility])    [address]  [address]  [facility] [address] [phone]              Who to contact     If you have questions or need follow up information about today's clinic visit or your schedule please contact [facility] directly at [phone].  Normal or non-critical lab and imaging results will be communicated to you by [employer], letter or phone within 4 business days after the clinic has received the results. If you do not hear from us within 7 days, please contact the clinic through [employer] or phone. If you have a critical or abnormal lab result, we will notify you by phone as soon as possible.  Submit refill requests through [employer] or call your pharmacy and they will forward the refill request to us. Please allow 3 business days for your refill to be completed.          Additional Information About Your Visit        [employer] Information     [employer] gives you secure access to your electronic health record. If you see a primary care provider, you can also send messages to your care team and make appointments. If you have  "questions, please call your primary care clinic.  If you do not have a primary care provider, please call [phone] and they will assist you.        Care EveryWhere ID     This is your Care EveryWhere ID. This could be used by other organizations to access your [facility] medical records  [id number]        Your Vitals Were     Height BMI (Body Mass Index)                5' 8\" (1.727 m) 30.11 kg/m2           Blood Pressure from Last 3 Encounters:   07/17/17 130/80   07/11/17 118/66   12/03/16 110/70    Weight from Last 3 Encounters:   07/17/17 198 lb (89.8 kg)   07/11/17 198 lb 12.8 oz (90.2 kg)   03/22/17 192 lb (87.1 kg)              We Performed the Following     ORTHOTICS REFERRAL        Primary Care Provider Office Phone # Fax #    Thom Cruz PA-C [phone]       [facility] [address] [facility] [address]        Equal Access to Services     ISAIAS MARY : Hadii aad ku hadasho Soomaali, waaxda luqadaha, qaybta kaalmada adeegyada, waxay idiin hayaan adeeg kharajerica marx . So [facility] [phone].    ATENCIÓN: Si habla español, tiene a salazar disposición servicios gratuitos de asistencia lingüística. Llame al [phone].    We comply with applicable federal civil rights laws and [place] laws. We do not discriminate on the basis of race, color, national origin, age, disability sex, sexual orientation or gender identity.            Thank you!     Thank you for choosing [facility]  for your care. Our goal is always to provide you with excellent care. Hearing back from our patients is one way we can continue to improve our services. Please take a few minutes to complete the written survey that you may receive in the mail after your visit with us. Thank you!             Your Updated Medication List - Protect others around you: Learn how to safely use, store and throw away your medicines at www.disposemymeds.org.          This list is accurate as of: " 7/17/17  2:31 PM.  Always use your most recent med list.                   Brand Name Dispense Instructions for use Diagnosis    albuterol 108 (90 BASE) MCG/ACT Inhaler    PROAIR HFA/PROVENTIL HFA/VENTOLIN HFA     Inhale 2 puffs into the lungs every 6 hours        alfuzosin 10 MG 24 hr tablet    UROXATRAL     Take 10 mg by mouth daily        beta carotene 89986 UNIT capsule      Take 10,000 Units by mouth daily        Bilberry 60 MG Caps           CALCIUM CITRATE + D PO      Take by mouth 2 times daily        diclofenac 1 % Gel topical gel    VOLTAREN    100 g    Apply ~ 4 grams to shoulders four times daily using enclosed dosing card.    Primary osteoarthritis of both shoulders       Ferrous Gluconate 324 (37.5 FE) MG Tabs      Take 1 tablet by mouth 2 times daily        finasteride 5 MG tablet    PROSCAR     Take 5 mg by mouth daily        flunisolide 25 MCG/ACT (0.025%) Soln spray    NASALIDE     Spray 2 sprays into both nostrils every 12 hours        lidocaine 2 % topical gel    XYLOCAINE    30 mL    Apply topically as needed for moderate pain    Pain in both feet, Ingrowing nail, left great toe, Ingrown nail of great toe of right foot, PVD (peripheral vascular disease) (H)       LUTEIN-ZEAXANTHIN PO           MAGNESIUM GLYCINATE PLUS PO           metroNIDAZOLE 0.75 % topical gel    METROGEL     Apply topically 2 times daily        minocycline 50 MG capsule    MINOCIN/DYNACIN     Take 50 mg by mouth daily as needed        MIRAPEX 0.125 MG tablet   Generic drug:  pramipexole      Take 0.25 mg by mouth daily (with dinner)        PRESERVISION AREDS Caps           saw palmetto 450 MG Caps capsule      Take 540 mg by mouth daily        Selenium 200 MCG Caps      Take 1 capsule by mouth every morning        traMADol 50 MG tablet    ULTRAM     Take by mouth At Bedtime

## 2017-07-17 NOTE — PROGRESS NOTES
Recd incoming records from [facility] 07/17/2017 and forwarded to Douglas Cruz for review and scanning
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
## 2017-07-18 RX ORDER — METHYLPREDNISOLONE ACETATE 40 MG/ML
40 INJECTION, SUSPENSION INTRA-ARTICULAR; INTRALESIONAL; INTRAMUSCULAR; SOFT TISSUE ONCE
Qty: 2 ML | Refills: 0 | OUTPATIENT
Start: 2017-07-18 | End: 2017-07-18

## 2017-07-19 PROBLEM — I71.40 ABDOMINAL AORTIC ANEURYSM (AAA) WITHOUT RUPTURE (H): Status: ACTIVE | Noted: 2017-07-19

## 2017-08-10 ENCOUNTER — TRANSFERRED RECORDS (OUTPATIENT)
Dept: HEALTH INFORMATION MANAGEMENT | Facility: CLINIC | Age: 82
End: 2017-08-10

## 2017-08-11 PROBLEM — C44.91 BASAL CELL CARCINOMA: Status: ACTIVE | Noted: 2017-08-11

## 2017-08-28 ASSESSMENT — ENCOUNTER SYMPTOMS
BRUISES/BLEEDS EASILY: 1
DYSURIA: 0
DIFFICULTY URINATING: 0
FLANK PAIN: 0
SWOLLEN GLANDS: 0
HEMATURIA: 0

## 2017-08-30 DIAGNOSIS — R35.0 URINARY FREQUENCY: Primary | ICD-10-CM

## 2017-09-01 ENCOUNTER — OFFICE VISIT (OUTPATIENT)
Dept: UROLOGY | Facility: CLINIC | Age: 82
End: 2017-09-01
Payer: MEDICARE

## 2017-09-01 VITALS
WEIGHT: 197 LBS | SYSTOLIC BLOOD PRESSURE: 130 MMHG | BODY MASS INDEX: 29.18 KG/M2 | HEART RATE: 80 BPM | DIASTOLIC BLOOD PRESSURE: 72 MMHG | HEIGHT: 69 IN

## 2017-09-01 DIAGNOSIS — R35.1 NOCTURIA: Primary | ICD-10-CM

## 2017-09-01 DIAGNOSIS — R35.0 URINARY FREQUENCY: ICD-10-CM

## 2017-09-01 LAB
ALBUMIN UR-MCNC: NEGATIVE MG/DL
APPEARANCE UR: CLEAR
BILIRUB UR QL STRIP: NEGATIVE
COLOR UR AUTO: YELLOW
GLUCOSE UR STRIP-MCNC: NEGATIVE MG/DL
HGB UR QL STRIP: NEGATIVE
KETONES UR STRIP-MCNC: NEGATIVE MG/DL
LEUKOCYTE ESTERASE UR QL STRIP: NEGATIVE
NITRATE UR QL: NEGATIVE
PH UR STRIP: 5 PH (ref 5–7)
SOURCE: NORMAL
SP GR UR STRIP: 1.02 (ref 1–1.03)
UROBILINOGEN UR STRIP-ACNC: 0.2 EU/DL (ref 0.2–1)

## 2017-09-01 PROCEDURE — 51798 US URINE CAPACITY MEASURE: CPT | Performed by: UROLOGY

## 2017-09-01 PROCEDURE — 81003 URINALYSIS AUTO W/O SCOPE: CPT | Performed by: UROLOGY

## 2017-09-01 PROCEDURE — 99203 OFFICE O/P NEW LOW 30 MIN: CPT | Mod: 25 | Performed by: UROLOGY

## 2017-09-01 RX ORDER — DESMOPRESSIN ACETATE 0.1 MG/1
0.1 TABLET ORAL AT BEDTIME
Qty: 10 TABLET | Refills: 11 | Status: SHIPPED | OUTPATIENT
Start: 2017-09-01 | End: 2021-04-15

## 2017-09-01 ASSESSMENT — PAIN SCALES - GENERAL: PAINLEVEL: NO PAIN (0)

## 2017-09-01 NOTE — PROGRESS NOTES
[facility]  Main Office: [address]  [address]  [place] [address]       CHIEF COMPLAINT:  Urinary frequency and nocturia    HISTORY:   This is a 89-year-old gentleman with a long-standing history of nocturia who recently moved here from [facility]. He had been seeing a urologist near his previous home and has been taking Uroxatral and finasteride. These improved his nocturia from initially 11 times a night down to 4 times a night. However, it has slowly worsened again and he is back to 5-7 times per night. He also has frequency during the day. He has tried anticholinergics previously with bad side effects. He has done pelvic floor physical therapy previously. He has no history of gross hematuria or infections.      PAST MEDICAL HISTORY: History reviewed. No pertinent past medical history.    PAST SURGICAL HISTORY: History reviewed. No pertinent surgical history.    FAMILY HISTORY:   Family History   Problem Relation Age of Onset     Coronary Artery Disease Mother      Hypertension Mother        SOCIAL HISTORY:   Social History   Substance Use Topics     Smoking status: Former Smoker     Smokeless tobacco: Never Used     Alcohol use Not on file          Allergies   Allergen Reactions     Ambien [Zolpidem]      Detrol [Tolterodine]      Erythromycin      Gabapentin      Keflex [Cephalexin] Hives     Klonopin [Clonazepam]      Oxybutynin      Terazosin          Current Outpatient Prescriptions:      diclofenac (VOLTAREN) 1 % GEL topical gel, Apply ~ 4 grams to shoulders four times daily using enclosed dosing card., Disp: 100 g, Rfl: 1     lidocaine (XYLOCAINE) 2 % topical gel, Apply topically as needed for moderate pain, Disp: 30 mL, Rfl: 0     beta carotene 77930 UNIT capsule, Take 10,000 Units by mouth daily, Disp: , Rfl:      Selenium 200 MCG CAPS, Take 1 capsule by mouth every morning, Disp: , Rfl:      MAGNESIUM GLYCINATE PLUS PO, , Disp: , Rfl:      Multiple Vitamins-Minerals  "(PRESERVISION AREDS) CAPS, , Disp: , Rfl:      Bilberry 60 MG CAPS, , Disp: , Rfl:      LUTEIN-ZEAXANTHIN PO, , Disp: , Rfl:      finasteride (PROSCAR) 5 MG tablet, Take 5 mg by mouth daily, Disp: , Rfl:      alfuzosin (UROXATRAL) 10 MG 24 hr tablet, Take 10 mg by mouth daily, Disp: , Rfl:      saw palmetto 450 MG CAPS capsule, Take 540 mg by mouth daily , Disp: , Rfl:      pramipexole (MIRAPEX) 0.125 MG tablet, Take 0.25 mg by mouth daily (with dinner), Disp: , Rfl:      Ferrous Gluconate 324 (37.5 FE) MG TABS, Take 1 tablet by mouth 2 times daily, Disp: , Rfl:      traMADol (ULTRAM) 50 MG tablet, Take by mouth At Bedtime, Disp: , Rfl:      Calcium Citrate-Vitamin D (CALCIUM CITRATE + D PO), Take by mouth 2 times daily, Disp: , Rfl:      flunisolide (NASALIDE) 25 MCG/ACT (0.025%) SOLN spray, Spray 2 sprays into both nostrils every 12 hours, Disp: , Rfl:      albuterol (PROAIR HFA/PROVENTIL HFA/VENTOLIN HFA) 108 (90 BASE) MCG/ACT Inhaler, Inhale 2 puffs into the lungs every 6 hours, Disp: , Rfl:      metroNIDAZOLE (METROGEL) 0.75 % topical gel, Apply topically 2 times daily, Disp: , Rfl:      minocycline (MINOCIN/DYNACIN) 50 MG capsule, Take 50 mg by mouth daily as needed, Disp: , Rfl:     Review Of Systems:  Skin: negative  Eyes: negative  Ears/Nose/Throat: negative  Respiratory: No shortness of breath, dyspnea on exertion, cough, or hemoptysis  Cardiovascular: negative  Gastrointestinal: negative  Genitourinary: negative  Musculoskeletal: negative  Neurologic: negative  Psychiatric: negative  Hematologic/Lymphatic/Immunologic: negative  Endocrine: negative      PHYSICAL EXAM:    /72  Pulse 80  Ht 1.753 m (5' 9\")  Wt 89.4 kg (197 lb)  BMI 29.09 kg/m2  General appearance: In NAD, conversant  HEENT: Normocephalic and atraumatic, anicteric sclera  Cardiovascular: Not examined  Respiratory: normal, non-labored breathing  Gastrointestinal: negative, Abdomen soft, non-tender, and non-distended. "   Musculoskeletal: Not Examined  Peripheral Vascular/extremity: No peripheral edema  Skin: Normal temperature, turgor, and texture. No rash  Psychiatric: Appropriate affect, alert and oriented to person, place, and time    Penis: Normal  Scrotal skin: Normal, no lesions  Testicles: Normal to palpation bilaterally  Epididymis: Normal to palpation bilaterally  Lymphatic: Normal inguinal lymph nodes  Digital Rectal Exam: His prostate is only slightly enlarged but benign and symmetric to palpation    Cystoscopy: Not done      PSA:     UA RESULTS:  Recent Labs   Lab Test  12/03/16   1106   COLOR  Yellow   APPEARANCE  Clear   URINEGLC  Negative   URINEBILI  Negative   URINEKETONE  Negative   SG  1.015   UBLD  Trace*   URINEPH  6.0   PROTEIN  Negative   UROBILINOGEN  0.2   NITRITE  Negative   LEUKEST  Negative   RBCU  O - 2   WBCU  O - 2       Bladder Scan: 0mL postvoid    Other Labs:      Imaging Studies: None      CLINICAL IMPRESSION:   Urinary frequency and nocturia    PLAN:   I discussed his symptoms along with his daughter today. He has frequency around the clock but really did not nocturia is what bothers him. We discussed medical and surgical treatment options. He does not wish to pursue any form of surgery. He does not wish to try another anticholinergic medication. We discussed conservative measures including limiting fluid intake and elevating the legs in the evening before bedtime. We discussed desmopressin as his other pharmacologic alternatives. He wishes to try the medication. We discussed the medication in detail today along with its risks, and particular hyponatremia and electrolyte abnormalities. He understands that this medication does carry the risk. He will need to have his large lites followed closely on the medication. He plans to begin the medication a week from Sunday and then on Monday morning he will have his electrolytes checked. I will contact his daughter regarding the results and if they are  okay he will have his electrolytes checked later that week.      Leonard Weiner MD    Answers for HPI/ROS submitted by the patient on 8/28/2017   General Symptoms: No  Skin Symptoms: No  HENT Symptoms: No  EYE SYMPTOMS: No  HEART SYMPTOMS: No  LUNG SYMPTOMS: No  INTESTINAL SYMPTOMS: No  URINARY SYMPTOMS: Yes  REPRODUCTIVE SYMPTOMS: No  SKELETAL SYMPTOMS: No  BLOOD SYMPTOMS: Yes  NERVOUS SYSTEM SYMPTOMS: No  MENTAL HEALTH SYMPTOMS: No  Trouble holding urine or incontinence: No  Pain or burning: No  Trouble starting or stopping: No  Increased frequency of urination: Yes  Blood in urine: No  Decreased frequency of urination: No  Frequent nighttime urination: Yes  Flank pain: No  Difficulty emptying bladder: No  Anemia: Yes  Swollen glands: No  Easy bleeding or bruising: Yes  Edema or swelling: No

## 2017-09-01 NOTE — MR AVS SNAPSHOT
After Visit Summary   9/1/2017    Eddie Stephens    MRN: [id number]           Patient Information     Date Of Birth          6/25/1922        Visit Information        Provider Department      9/1/2017 2:15 PM Leonard Weiner MD [facility] [place]        Today's Diagnoses     Nocturia    -  1    Urinary frequency           Follow-ups after your visit        Follow-up notes from your care team     Return for I will call with results.      Your next 10 appointments already scheduled     Sep 11, 2017  9:45 AM CDT   LAB with [facility] LAB   [facility]    [address] [facility] [address]   [phone]           Patient must bring picture ID. Patient should be prepared to give a urine specimen  Please do not eat 10-12 hours before your appointment if you are coming in fasting for labs on lipids, cholesterol, or glucose (sugar). Pregnant women should follow their Care Team instructions. Water with medications is okay. Do not drink coffee or other fluids. If you have concerns about taking  your medications, please ask at office or if scheduling via [employer], send a message by clicking on Secure Messaging, Message Your Care Team.            Sep 15, 2017  9:45 AM CDT   LAB with [facility] LAB   [facility]    [address]  [facility] [address]   [phone]           Patient must bring picture ID. Patient should be prepared to give a urine specimen  Please do not eat 10-12 hours before your appointment if you are coming in fasting for labs on lipids, cholesterol, or glucose (sugar). Pregnant women should follow their Care Team instructions. Water with medications is okay. Do not drink coffee or other fluids. If you have concerns about taking  your medications, please ask at office or if scheduling via [employer], send a message by clicking on Secure Messaging,  "Message Your Care Team.              Future tests that were ordered for you today     Open Future Orders        Priority Expected Expires Ordered    **Basic metabolic panel FUTURE 14d Routine 9/8/2017 9/15/2017 9/1/2017            Who to contact     If you have questions or need follow up information about today's clinic visit or your schedule please contact [facility] [place] directly at [phone].  Normal or non-critical lab and imaging results will be communicated to you by [employer], letter or phone within 4 business days after the clinic has received the results. If you do not hear from us within 7 days, please contact the clinic through [employer] or phone. If you have a critical or abnormal lab result, we will notify you by phone as soon as possible.  Submit refill requests through [employer] or call your pharmacy and they will forward the refill request to us. Please allow 3 business days for your refill to be completed.          Additional Information About Your Visit        [employer] Information     [employer] gives you secure access to your electronic health record. If you see a primary care provider, you can also send messages to your care team and make appointments. If you have questions, please call your primary care clinic.  If you do not have a primary care provider, please call [phone] and they will assist you.        Care EveryWhere ID     This is your Care EveryWhere ID. This could be used by other organizations to access your [place] medical records  [id number]        Your Vitals Were     Pulse Height BMI (Body Mass Index)             80 1.753 m (5' 9\") 29.09 kg/m2          Blood Pressure from Last 3 Encounters:   09/01/17 130/72   07/17/17 130/80   07/11/17 118/66    Weight from Last 3 Encounters:   09/01/17 89.4 kg (197 lb)   07/17/17 89.8 kg (198 lb)   07/11/17 90.2 kg (198 lb 12.8 oz)              We Performed the Following     MEASURE POST-VOID RESIDUAL " URINE/BLADDER CAPACITY, US NON-IMAGING (55270)     UA without Microscopic [LHC5810]          Today's Medication Changes          These changes are accurate as of: 9/1/17  3:00 PM.  If you have any questions, ask your nurse or doctor.               Start taking these medicines.        Dose/Directions    desmopressin 0.1 MG tablet   Commonly known as:  DDAVP   Used for:  Nocturia   Started by:  Leonard Weiner MD        Dose:  0.1 mg   Take 1 tablet (100 mcg) by mouth At Bedtime   Quantity:  10 tablet   Refills:  11            Where to get your medicines      These medications were sent to [facility] [address] [place] [address]     Phone:  [phone]     desmopressin 0.1 MG tablet                Primary Care Provider Office Phone # Fax #    Thom Alexis Cruz PA-C [phone] [address] [address] [facility] [address]        Equal Access to Services     SEJAL [facility] AH: Hadii aad ku hadasho Soomaali, waaxda luqadaha, qaybta kaalmada adeegyada, waxay idiin hayaan adeeg kharash la'horacio . So [facility] [phone].    ATENCIÓN: Si habla español, tiene a salazar disposición servicios gratuitos de asistencia lingüística. LlKettering [facility] Campus [phone].    We comply with applicable federal civil rights laws and [place] laws. We do not discriminate on the basis of race, color, national origin, age, disability sex, sexual orientation or gender identity.            Thank you!     Thank you for choosing [facility] [place]  for your care. Our goal is always to provide you with excellent care. Hearing back from our patients is one way we can continue to improve our services. Please take a few minutes to complete the written survey that you may receive in the mail after your visit with us. Thank you!             Your Updated Medication List - Protect others around you: Learn how to safely use, store and throw away  your medicines at www.disposemymeds.org.          This list is accurate as of: 9/1/17  3:00 PM.  Always use your most recent med list.                   Brand Name Dispense Instructions for use Diagnosis    albuterol 108 (90 BASE) MCG/ACT Inhaler    PROAIR HFA/PROVENTIL HFA/VENTOLIN HFA     Inhale 2 puffs into the lungs every 6 hours        alfuzosin 10 MG 24 hr tablet    UROXATRAL     Take 10 mg by mouth daily        beta carotene 91023 UNIT capsule      Take 10,000 Units by mouth daily        Bilberry 60 MG Caps           CALCIUM CITRATE + D PO      Take by mouth 2 times daily        desmopressin 0.1 MG tablet    DDAVP    10 tablet    Take 1 tablet (100 mcg) by mouth At Bedtime    Nocturia       diclofenac 1 % Gel topical gel    VOLTAREN    100 g    Apply ~ 4 grams to shoulders four times daily using enclosed dosing card.    Primary osteoarthritis of both shoulders       Ferrous Gluconate 324 (37.5 FE) MG Tabs      Take 1 tablet by mouth 2 times daily        finasteride 5 MG tablet    PROSCAR     Take 5 mg by mouth daily        flunisolide 25 MCG/ACT (0.025%) Soln spray    NASALIDE     Spray 2 sprays into both nostrils every 12 hours        lidocaine 2 % topical gel    XYLOCAINE    30 mL    Apply topically as needed for moderate pain    Pain in both feet, Ingrowing nail, left great toe, Ingrown nail of great toe of right foot, PVD (peripheral vascular disease) (H)       LUTEIN-ZEAXANTHIN PO           MAGNESIUM GLYCINATE PLUS PO           metroNIDAZOLE 0.75 % topical gel    METROGEL     Apply topically 2 times daily        minocycline 50 MG capsule    MINOCIN/DYNACIN     Take 50 mg by mouth daily as needed        MIRAPEX 0.125 MG tablet   Generic drug:  pramipexole      Take 0.25 mg by mouth daily (with dinner)        PRESERVISION AREDS Caps           saw palmetto 450 MG Caps capsule      Take 540 mg by mouth daily        Selenium 200 MCG Caps      Take 1 capsule by mouth every morning        traMADol 50 MG tablet     ULTRAM     Take by mouth At Bedtime

## 2017-09-01 NOTE — LETTER
9/1/2017     RE: Eddie Stephens  [address]  [facility] [address]     Dear Colleague,    Thank you for referring your patient, Eddie Stephens, to the [facility] [place] [facility]. Please see a copy of my visit note below.    [facility]  Main Office: [address]  [address]  [place] [address]       CHIEF COMPLAINT:  Urinary frequency and nocturia    HISTORY:   This is a 89-year-old gentleman with a long-standing history of nocturia who recently moved here from [facility]. He had been seeing a urologist near his previous home and has been taking Uroxatral and finasteride. These improved his nocturia from initially 11 times a night down to 4 times a night. However, it has slowly worsened again and he is back to 5-7 times per night. He also has frequency during the day. He has tried anticholinergics previously with bad side effects. He has done pelvic floor physical therapy previously. He has no history of gross hematuria or infections.      PAST MEDICAL HISTORY: History reviewed. No pertinent past medical history.    PAST SURGICAL HISTORY: History reviewed. No pertinent surgical history.    FAMILY HISTORY:   Family History   Problem Relation Age of Onset     Coronary Artery Disease Mother      Hypertension Mother        SOCIAL HISTORY:   Social History   Substance Use Topics     Smoking status: Former Smoker     Smokeless tobacco: Never Used     Alcohol use Not on file          Allergies   Allergen Reactions     Ambien [Zolpidem]      Detrol [Tolterodine]      Erythromycin      Gabapentin      Keflex [Cephalexin] Hives     Klonopin [Clonazepam]      Oxybutynin      Terazosin          Current Outpatient Prescriptions:      diclofenac (VOLTAREN) 1 % GEL topical gel, Apply ~ 4 grams to shoulders four times daily using enclosed dosing card., Disp: 100 g, Rfl: 1     lidocaine (XYLOCAINE) 2 % topical gel,  Apply topically as needed for moderate pain, Disp: 30 mL, Rfl: 0     beta carotene 14982 UNIT capsule, Take 10,000 Units by mouth daily, Disp: , Rfl:      Selenium 200 MCG CAPS, Take 1 capsule by mouth every morning, Disp: , Rfl:      MAGNESIUM GLYCINATE PLUS PO, , Disp: , Rfl:      Multiple Vitamins-Minerals (PRESERVISION AREDS) CAPS, , Disp: , Rfl:      Bilberry 60 MG CAPS, , Disp: , Rfl:      LUTEIN-ZEAXANTHIN PO, , Disp: , Rfl:      finasteride (PROSCAR) 5 MG tablet, Take 5 mg by mouth daily, Disp: , Rfl:      alfuzosin (UROXATRAL) 10 MG 24 hr tablet, Take 10 mg by mouth daily, Disp: , Rfl:      saw palmetto 450 MG CAPS capsule, Take 540 mg by mouth daily , Disp: , Rfl:      pramipexole (MIRAPEX) 0.125 MG tablet, Take 0.25 mg by mouth daily (with dinner), Disp: , Rfl:      Ferrous Gluconate 324 (37.5 FE) MG TABS, Take 1 tablet by mouth 2 times daily, Disp: , Rfl:      traMADol (ULTRAM) 50 MG tablet, Take by mouth At Bedtime, Disp: , Rfl:      Calcium Citrate-Vitamin D (CALCIUM CITRATE + D PO), Take by mouth 2 times daily, Disp: , Rfl:      flunisolide (NASALIDE) 25 MCG/ACT (0.025%) SOLN spray, Spray 2 sprays into both nostrils every 12 hours, Disp: , Rfl:      albuterol (PROAIR HFA/PROVENTIL HFA/VENTOLIN HFA) 108 (90 BASE) MCG/ACT Inhaler, Inhale 2 puffs into the lungs every 6 hours, Disp: , Rfl:      metroNIDAZOLE (METROGEL) 0.75 % topical gel, Apply topically 2 times daily, Disp: , Rfl:      minocycline (MINOCIN/DYNACIN) 50 MG capsule, Take 50 mg by mouth daily as needed, Disp: , Rfl:     Review Of Systems:  Skin: negative  Eyes: negative  Ears/Nose/Throat: negative  Respiratory: No shortness of breath, dyspnea on exertion, cough, or hemoptysis  Cardiovascular: negative  Gastrointestinal: negative  Genitourinary: negative  Musculoskeletal: negative  Neurologic: negative  Psychiatric: negative  Hematologic/Lymphatic/Immunologic: negative  Endocrine: negative      PHYSICAL EXAM:    /72  Pulse 80  Ht  "1.753 m (5' 9\")  Wt 89.4 kg (197 lb)  BMI 29.09 kg/m2  General appearance: In NAD, conversant  HEENT: Normocephalic and atraumatic, anicteric sclera  Cardiovascular: Not examined  Respiratory: normal, non-labored breathing  Gastrointestinal: negative, Abdomen soft, non-tender, and non-distended.   Musculoskeletal: Not Examined  Peripheral Vascular/extremity: No peripheral edema  Skin: Normal temperature, turgor, and texture. No rash  Psychiatric: Appropriate affect, alert and oriented to person, place, and time    Penis: Normal  Scrotal skin: Normal, no lesions  Testicles: Normal to palpation bilaterally  Epididymis: Normal to palpation bilaterally  Lymphatic: Normal inguinal lymph nodes  Digital Rectal Exam: His prostate is only slightly enlarged but benign and symmetric to palpation    Cystoscopy: Not done      PSA:     UA RESULTS:  Recent Labs   Lab Test  12/03/16   1106   COLOR  Yellow   APPEARANCE  Clear   URINEGLC  Negative   URINEBILI  Negative   URINEKETONE  Negative   SG  1.015   UBLD  Trace*   URINEPH  6.0   PROTEIN  Negative   UROBILINOGEN  0.2   NITRITE  Negative   LEUKEST  Negative   RBCU  O - 2   WBCU  O - 2       Bladder Scan: 0mL postvoid    Other Labs:      Imaging Studies: None      CLINICAL IMPRESSION:   Urinary frequency and nocturia    PLAN:   I discussed his symptoms along with his daughter today. He has frequency around the clock but really did not nocturia is what bothers him. We discussed medical and surgical treatment options. He does not wish to pursue any form of surgery. He does not wish to try another anticholinergic medication. We discussed conservative measures including limiting fluid intake and elevating the legs in the evening before bedtime. We discussed desmopressin as his other pharmacologic alternatives. He wishes to try the medication. We discussed the medication in detail today along with its risks, and particular hyponatremia and electrolyte abnormalities. He understands " that this medication does carry the risk. He will need to have his large lites followed closely on the medication. He plans to begin the medication a week from Sunday and then on Monday morning he will have his electrolytes checked. I will contact his daughter regarding the results and if they are okay he will have his electrolytes checked later that week.      Leonard Weiner MD      Answers for HPI/ROS submitted by the patient on 8/28/2017   General Symptoms: No  Skin Symptoms: No  HENT Symptoms: No  EYE SYMPTOMS: No  HEART SYMPTOMS: No  LUNG SYMPTOMS: No  INTESTINAL SYMPTOMS: No  URINARY SYMPTOMS: Yes  REPRODUCTIVE SYMPTOMS: No  SKELETAL SYMPTOMS: No  BLOOD SYMPTOMS: Yes  NERVOUS SYSTEM SYMPTOMS: No  MENTAL HEALTH SYMPTOMS: No  Trouble holding urine or incontinence: No  Pain or burning: No  Trouble starting or stopping: No  Increased frequency of urination: Yes  Blood in urine: No  Decreased frequency of urination: No  Frequent nighttime urination: Yes  Flank pain: No  Difficulty emptying bladder: No  Anemia: Yes  Swollen glands: No  Easy bleeding or bruising: Yes  Edema or swelling: No    Again, thank you for allowing me to participate in the care of your patient.      Sincerely,    Leonard Weiner MD

## 2017-09-11 DIAGNOSIS — R35.0 URINARY FREQUENCY: ICD-10-CM

## 2017-09-11 PROCEDURE — 80048 BASIC METABOLIC PNL TOTAL CA: CPT | Performed by: UROLOGY

## 2017-09-11 PROCEDURE — 36415 COLL VENOUS BLD VENIPUNCTURE: CPT | Performed by: UROLOGY

## 2017-09-12 LAB
ANION GAP SERPL CALCULATED.3IONS-SCNC: 8 MMOL/L (ref 3–14)
BUN SERPL-MCNC: 27 MG/DL (ref 7–30)
CALCIUM SERPL-MCNC: 9.1 MG/DL (ref 8.5–10.1)
CHLORIDE SERPL-SCNC: 103 MMOL/L (ref 94–109)
CO2 SERPL-SCNC: 25 MMOL/L (ref 20–32)
CREAT SERPL-MCNC: 1.17 MG/DL (ref 0.66–1.25)
GFR SERPL CREATININE-BSD FRML MDRD: 58 ML/MIN/1.7M2
GLUCOSE SERPL-MCNC: 106 MG/DL (ref 70–99)
POTASSIUM SERPL-SCNC: 5 MMOL/L (ref 3.4–5.3)
SODIUM SERPL-SCNC: 136 MMOL/L (ref 133–144)

## 2017-09-14 ENCOUNTER — TRANSFERRED RECORDS (OUTPATIENT)
Dept: HEALTH INFORMATION MANAGEMENT | Facility: CLINIC | Age: 82
End: 2017-09-14

## 2017-09-27 ENCOUNTER — TELEPHONE (OUTPATIENT)
Dept: UROLOGY | Facility: CLINIC | Age: 82
End: 2017-09-27

## 2017-09-27 NOTE — TELEPHONE ENCOUNTER
Pt daughter calling about pt's medication. She has been trying to get information since Friday, although I see no documentation. [facility]. She reports the medication not working as of yet, pt is still getting up 4 - 6 times a night. They would like to know if it is safe to increase the medication, and if so, can [facility] (or she can  new Rx).

## 2017-09-28 NOTE — TELEPHONE ENCOUNTER
Yes I sent a message to the schedulers that I'd like to see the patient again before increasing dosage  He should continue with current dose for now thanks

## 2017-09-29 ENCOUNTER — TELEPHONE (OUTPATIENT)
Dept: UROLOGY | Facility: CLINIC | Age: 82
End: 2017-09-29

## 2017-09-29 DIAGNOSIS — R35.1 NOCTURIA: Primary | ICD-10-CM

## 2017-09-29 RX ORDER — DESMOPRESSIN ACETATE 0.1 MG/1
0.1 TABLET ORAL DAILY
Qty: 30 TABLET | Refills: 11 | Status: SHIPPED | OUTPATIENT
Start: 2017-09-29 | End: 2019-02-28

## 2017-09-29 NOTE — TELEPHONE ENCOUNTER
Triage phone call:    Patient's daughter called nurse line.  Inquired about status for patient's new med (Desmopressin).  Informed her, per MD- patient should make a follow-up to discuss changing dose.  She is aware, patient should stay on current dose.   Patient is out of Med currently.   Will forward to MD [facility].    Radha Mairn LPN

## 2017-11-03 ENCOUNTER — OFFICE VISIT (OUTPATIENT)
Dept: UROLOGY | Facility: CLINIC | Age: 82
End: 2017-11-03
Payer: MEDICARE

## 2017-11-03 VITALS — OXYGEN SATURATION: 95 % | WEIGHT: 196 LBS | BODY MASS INDEX: 29.03 KG/M2 | HEIGHT: 69 IN | HEART RATE: 60 BPM

## 2017-11-03 DIAGNOSIS — R35.1 NOCTURIA: Primary | ICD-10-CM

## 2017-11-03 DIAGNOSIS — R35.1 NOCTURIA: ICD-10-CM

## 2017-11-03 PROCEDURE — 80048 BASIC METABOLIC PNL TOTAL CA: CPT | Performed by: UROLOGY

## 2017-11-03 PROCEDURE — 99213 OFFICE O/P EST LOW 20 MIN: CPT | Performed by: UROLOGY

## 2017-11-03 PROCEDURE — 36415 COLL VENOUS BLD VENIPUNCTURE: CPT | Performed by: UROLOGY

## 2017-11-03 ASSESSMENT — PAIN SCALES - GENERAL: PAINLEVEL: NO PAIN (0)

## 2017-11-03 NOTE — MR AVS SNAPSHOT
After Visit Summary   11/3/2017    Eddie Stephens    MRN: [id number]           Patient Information     Date Of Birth          6/25/1922        Visit Information        Provider Department      11/3/2017 1:15 PM Leonard Weiner MD [facility]        Today's Diagnoses     Nocturia    -  1       Follow-ups after your visit        Follow-up notes from your care team     Return if symptoms worsen or fail to improve.      Future tests that were ordered for you today     Open Future Orders        Priority Expected Expires Ordered    Basic metabolic panel  (Ca, Cl, CO2, Creat, Gluc, K, Na, BUN) Routine  11/3/2018 11/3/2017            Who to contact     If you have questions or need follow up information about today's clinic visit or your schedule please contact [facility] directly at [phone].  Normal or non-critical lab and imaging results will be communicated to you by [employer], letter or phone within 4 business days after the clinic has received the results. If you do not hear from us within 7 days, please contact the clinic through [employer] or phone. If you have a critical or abnormal lab result, we will notify you by phone as soon as possible.  Submit refill requests through [employer] or call your pharmacy and they will forward the refill request to us. Please allow 3 business days for your refill to be completed.          Additional Information About Your Visit        MyChart Information     [employer] gives you secure access to your electronic health record. If you see a primary care provider, you can also send messages to your care team and make appointments. If you have questions, please call your primary care clinic.  If you do not have a primary care provider, please call [phone] and they will assist you.        Care EveryWhere ID     This is your Care EveryWhere ID. This could be used by other  "organizations to access your [place] medical records  [id number]        Your Vitals Were     Pulse Height Pulse Oximetry BMI (Body Mass Index)          60 1.753 m (5' 9\") 95% 28.94 kg/m2         Blood Pressure from Last 3 Encounters:   09/01/17 130/72   07/17/17 130/80   07/11/17 118/66    Weight from Last 3 Encounters:   11/03/17 88.9 kg (196 lb)   09/01/17 89.4 kg (197 lb)   07/17/17 89.8 kg (198 lb)               Primary Care Provider Office Phone # Fax #    Thom Cruz PA-C [phone]       [address] [address]  [facility] [address]        Equal Access to Services     ISAIAS MARY : Hadii juan josé mathews hadasho Soomaali, waaxda luqadaha, qaybta kaalmada adeegyada, waxay mimiin hayaustenn fidel marx . So [facility] [phone].    ATENCIÓN: Si habla español, tiene a salazar disposición servicios gratuitos de asistencia lingüística. Llame al [phone].    We comply with applicable federal civil rights laws and [place] laws. We do not discriminate on the basis of race, color, national origin, age, disability, sex, sexual orientation, or gender identity.            Thank you!     Thank you for choosing [facility] [place]  for your care. Our goal is always to provide you with excellent care. Hearing back from our patients is one way we can continue to improve our services. Please take a few minutes to complete the written survey that you may receive in the mail after your visit with us. Thank you!             Your Updated Medication List - Protect others around you: Learn how to safely use, store and throw away your medicines at www.disposemymeds.org.          This list is accurate as of: 11/3/17  1:29 PM.  Always use your most recent med list.                   Brand Name Dispense Instructions for use Diagnosis    albuterol 108 (90 BASE) MCG/ACT Inhaler    PROAIR HFA/PROVENTIL HFA/VENTOLIN HFA     Inhale 2 puffs into the lungs every 6 hours        alfuzosin 10 " MG 24 hr tablet    UROXATRAL     Take 10 mg by mouth daily        beta carotene 59061 UNIT capsule      Take 10,000 Units by mouth daily        Bilberry 60 MG Caps           CALCIUM CITRATE + D PO      Take by mouth 2 times daily        * desmopressin 0.1 MG tablet    DDAVP    10 tablet    Take 1 tablet (100 mcg) by mouth At Bedtime    Nocturia       * desmopressin 0.1 MG tablet    DDAVP    30 tablet    Take 1 tablet (100 mcg) by mouth daily    Nocturia       diclofenac 1 % Gel topical gel    VOLTAREN    100 g    Apply ~ 4 grams to shoulders four times daily using enclosed dosing card.    Primary osteoarthritis of both shoulders       Ferrous Gluconate 324 (37.5 FE) MG Tabs      Take 1 tablet by mouth 2 times daily        finasteride 5 MG tablet    PROSCAR     Take 5 mg by mouth daily        flunisolide 25 MCG/ACT (0.025%) Soln spray    NASALIDE     Spray 2 sprays into both nostrils every 12 hours        lidocaine 2 % topical gel    XYLOCAINE    30 mL    Apply topically as needed for moderate pain    Pain in both feet, Ingrowing nail, left great toe, Ingrown nail of great toe of right foot, PVD (peripheral vascular disease) (H)       LUTEIN-ZEAXANTHIN PO           MAGNESIUM GLYCINATE PLUS PO           metroNIDAZOLE 0.75 % topical gel    METROGEL     Apply topically 2 times daily        minocycline 50 MG capsule    MINOCIN/DYNACIN     Take 50 mg by mouth daily as needed        MIRAPEX 0.125 MG tablet   Generic drug:  pramipexole      Take 0.25 mg by mouth daily (with dinner)        PRESERVISION AREDS Caps           saw palmetto 450 MG Caps capsule      Take 540 mg by mouth daily        Selenium 200 MCG Caps      Take 1 capsule by mouth every morning        traMADol 50 MG tablet    ULTRAM     Take by mouth At Bedtime        * Notice:  This list has 2 medication(s) that are the same as other medications prescribed for you. Read the directions carefully, and ask your doctor or other care provider to review them with  you.

## 2017-11-03 NOTE — PROGRESS NOTES
Office Visit Note  [facility]  [phone]    UROLOGIC DIAGNOSES:   Nocturia    CURRENT INTERVENTIONS:   Desmopressin 0.1mg nightly    HISTORY:   Eddie returns to clinic today along with his daughter for follow-up on his nocturia. He has been taking desmopressin 0.1 mg nightly. His electrolytes have remained normal and he shows no evidence of hyponatremia. They report that his nocturia improved a little bit at first and then it improves more when he increased his nighttime tramadol dose. He is now getting up 2-4 times nightly. This is a good improvement for him.      PAST MEDICAL HISTORY: History reviewed. No pertinent past medical history.    PAST SURGICAL HISTORY: History reviewed. No pertinent surgical history.    FAMILY HISTORY:   Family History   Problem Relation Age of Onset     Coronary Artery Disease Mother      Hypertension Mother        SOCIAL HISTORY:   Social History   Substance Use Topics     Smoking status: Former Smoker     Smokeless tobacco: Never Used     Alcohol use Not on file       Current Outpatient Prescriptions   Medication     desmopressin (DDAVP) 0.1 MG tablet     desmopressin (DDAVP) 0.1 MG tablet     diclofenac (VOLTAREN) 1 % GEL topical gel     lidocaine (XYLOCAINE) 2 % topical gel     beta carotene 04813 UNIT capsule     Selenium 200 MCG CAPS     MAGNESIUM GLYCINATE PLUS PO     Multiple Vitamins-Minerals (PRESERVISION AREDS) CAPS     Bilberry 60 MG CAPS     LUTEIN-ZEAXANTHIN PO     finasteride (PROSCAR) 5 MG tablet     alfuzosin (UROXATRAL) 10 MG 24 hr tablet     saw palmetto 450 MG CAPS capsule     pramipexole (MIRAPEX) 0.125 MG tablet     Ferrous Gluconate 324 (37.5 FE) MG TABS     traMADol (ULTRAM) 50 MG tablet     Calcium Citrate-Vitamin D (CALCIUM CITRATE + D PO)     flunisolide (NASALIDE) 25 MCG/ACT (0.025%) SOLN spray     albuterol (PROAIR HFA/PROVENTIL HFA/VENTOLIN HFA) 108 (90 BASE) MCG/ACT Inhaler     metroNIDAZOLE (METROGEL) 0.75 % topical gel     minocycline  "(MINOCIN/DYNACIN) 50 MG capsule     No current facility-administered medications for this visit.          PHYSICAL EXAM:    Pulse 60  Ht 1.753 m (5' 9\")  Wt 88.9 kg (196 lb)  SpO2 95%  BMI 28.94 kg/m2    HEENT: Normocephalic and atraumatic   Cardiac: Not done  Back/Flank: Not done  CNS/PNS: Not done  Respiratory: Normal non-labored breathing  Abdomen: Soft nontender and nondistended  Peripheral Vascular: Not done  Mental Status: Not done    Penis: Not done  Scrotal Skin: Not done  Testicles: Not done  Epididymis: Not done  Digital Rectal Exam:     Cystoscopy: Not done    Imaging: None    Urinalysis: UA RESULTS:  Recent Labs   Lab Test  09/01/17   1431  12/03/16   1106   COLOR  Yellow  Yellow   APPEARANCE  Clear  Clear   URINEGLC  Negative  Negative   URINEBILI  Negative  Negative   URINEKETONE  Negative  Negative   SG  1.020  1.015   UBLD  Negative  Trace*   URINEPH  5.0  6.0   PROTEIN  Negative  Negative   UROBILINOGEN  0.2  0.2   NITRITE  Negative  Negative   LEUKEST  Negative  Negative   RBCU   --   O - 2   WBCU   --   O - 2       PSA:     Post Void Residual:     Other labs: None today      IMPRESSION:  Nocturia    PLAN:  His nocturia has improved. We discussed his options. He could increase the dose if he likes to 0.2 mg to see if there is further improvement, if he did that he would need to follow his electrolytes more closely again. He and his daughter are happy right now with his symptoms and they will make no changes for now. If nocturia gets worse in the future they should contact me so that we can arrange increasing the dose and following his sodium levels. He will have that looked like panel drawn on the way out today as well.    Total Time: 15 minutes                                      Total in Consultation: 15 minutes      Leonard Weiner M.D.            "

## 2017-11-03 NOTE — LETTER
11/3/2017       RE: Eddie Stephens  [address]  [place] [address]     Dear Colleague,    Thank you for referring your patient, Eddie Stephens, to the [facility] [place] at [facility]. Please see a copy of my visit note below.    Office Visit Note  [facility]  [phone]    UROLOGIC DIAGNOSES:   Nocturia    CURRENT INTERVENTIONS:   Desmopressin 0.1mg nightly    HISTORY:   Eddie returns to clinic today along with his daughter for follow-up on his nocturia. He has been taking desmopressin 0.1 mg nightly. His electrolytes have remained normal and he shows no evidence of hyponatremia. They report that his nocturia improved a little bit at first and then it improves more when he increased his nighttime tramadol dose. He is now getting up 2-4 times nightly. This is a good improvement for him.      PAST MEDICAL HISTORY: History reviewed. No pertinent past medical history.    PAST SURGICAL HISTORY: History reviewed. No pertinent surgical history.    FAMILY HISTORY:   Family History   Problem Relation Age of Onset     Coronary Artery Disease Mother      Hypertension Mother        SOCIAL HISTORY:   Social History   Substance Use Topics     Smoking status: Former Smoker     Smokeless tobacco: Never Used     Alcohol use Not on file       Current Outpatient Prescriptions   Medication     desmopressin (DDAVP) 0.1 MG tablet     desmopressin (DDAVP) 0.1 MG tablet     diclofenac (VOLTAREN) 1 % GEL topical gel     lidocaine (XYLOCAINE) 2 % topical gel     beta carotene 73276 UNIT capsule     Selenium 200 MCG CAPS     MAGNESIUM GLYCINATE PLUS PO     Multiple Vitamins-Minerals (PRESERVISION AREDS) CAPS     Bilberry 60 MG CAPS     LUTEIN-ZEAXANTHIN PO     finasteride (PROSCAR) 5 MG tablet     alfuzosin (UROXATRAL) 10 MG 24 hr tablet     saw palmetto 450 MG CAPS capsule     pramipexole (MIRAPEX) 0.125 MG tablet     Ferrous  "Gluconate 324 (37.5 FE) MG TABS     traMADol (ULTRAM) 50 MG tablet     Calcium Citrate-Vitamin D (CALCIUM CITRATE + D PO)     flunisolide (NASALIDE) 25 MCG/ACT (0.025%) SOLN spray     albuterol (PROAIR HFA/PROVENTIL HFA/VENTOLIN HFA) 108 (90 BASE) MCG/ACT Inhaler     metroNIDAZOLE (METROGEL) 0.75 % topical gel     minocycline (MINOCIN/DYNACIN) 50 MG capsule     No current facility-administered medications for this visit.          PHYSICAL EXAM:    Pulse 60  Ht 1.753 m (5' 9\")  Wt 88.9 kg (196 lb)  SpO2 95%  BMI 28.94 kg/m2    HEENT: Normocephalic and atraumatic   Cardiac: Not done  Back/Flank: Not done  CNS/PNS: Not done  Respiratory: Normal non-labored breathing  Abdomen: Soft nontender and nondistended  Peripheral Vascular: Not done  Mental Status: Not done    Penis: Not done  Scrotal Skin: Not done  Testicles: Not done  Epididymis: Not done  Digital Rectal Exam:     Cystoscopy: Not done    Imaging: None    Urinalysis: UA RESULTS:  Recent Labs   Lab Test  09/01/17   1431  12/03/16   1106   COLOR  Yellow  Yellow   APPEARANCE  Clear  Clear   URINEGLC  Negative  Negative   URINEBILI  Negative  Negative   URINEKETONE  Negative  Negative   SG  1.020  1.015   UBLD  Negative  Trace*   URINEPH  5.0  6.0   PROTEIN  Negative  Negative   UROBILINOGEN  0.2  0.2   NITRITE  Negative  Negative   LEUKEST  Negative  Negative   RBCU   --   O - 2   WBCU   --   O - 2       PSA:     Post Void Residual:     Other labs: None today      IMPRESSION:  Nocturia    PLAN:  His nocturia has improved. We discussed his options. He could increase the dose if he likes to 0.2 mg to see if there is further improvement, if he did that he would need to follow his electrolytes more closely again. He and his daughter are happy right now with his symptoms and they will make no changes for now. If nocturia gets worse in the future they should contact me so that we can arrange increasing the dose and following his sodium levels. He will have that " looked like panel drawn on the way out today as well.    Total Time: 15 minutes                                      Total in Consultation: 15 minutes      Again, thank you for allowing me to participate in the care of your patient.      Sincerely,    Leonard Weiner MD

## 2017-11-04 LAB
ANION GAP SERPL CALCULATED.3IONS-SCNC: 6 MMOL/L (ref 3–14)
BUN SERPL-MCNC: 20 MG/DL (ref 7–30)
CALCIUM SERPL-MCNC: 8.4 MG/DL (ref 8.5–10.1)
CHLORIDE SERPL-SCNC: 102 MMOL/L (ref 94–109)
CO2 SERPL-SCNC: 26 MMOL/L (ref 20–32)
CREAT SERPL-MCNC: 1.02 MG/DL (ref 0.66–1.25)
GFR SERPL CREATININE-BSD FRML MDRD: 68 ML/MIN/1.7M2
GLUCOSE SERPL-MCNC: 105 MG/DL (ref 70–99)
POTASSIUM SERPL-SCNC: 5.1 MMOL/L (ref 3.4–5.3)
SODIUM SERPL-SCNC: 134 MMOL/L (ref 133–144)

## 2018-05-15 ENCOUNTER — TELEPHONE (OUTPATIENT)
Dept: URGENT CARE | Facility: URGENT CARE | Age: 83
End: 2018-05-15

## 2018-05-15 DIAGNOSIS — R68.89 INFLUENZA-LIKE SYMPTOMS: Primary | ICD-10-CM

## 2018-05-15 RX ORDER — OSELTAMIVIR PHOSPHATE 75 MG/1
75 CAPSULE ORAL 2 TIMES DAILY
Qty: 10 CAPSULE | Refills: 0 | Status: SHIPPED | OUTPATIENT
Start: 2018-05-15 | End: 2019-02-28

## 2019-02-27 NOTE — PROGRESS NOTES
"  SUBJECTIVE:   Eddie Stephens is a 89 year old male who presents to clinic today for the following health issues:    RESPIRATORY SYMPTOMS      Duration: ***    Description  { :289856}    Severity: {MILD, MODERATE, SEVERE LOW:729233}    Accompanying signs and symptoms: {NONE DEFAULTED:049995::\"None\"}    History (predisposing factors):  { :673705::\"none\"}    Precipitating or alleviating factors: {NONE DEFAULTED:755368::\"None\"}    Therapies tried and outcome:  { :776811::\"none\"}      {additional problems for provider to add:015509}    Problem list and histories reviewed & adjusted, as indicated.  Additional history: {NONE - AS DOCUMENTED:244913::\"as documented\"}    {HIST REVIEW/ LINKS 2:630573}    Reviewed and updated as needed this visit by clinical staff       Reviewed and updated as needed this visit by Provider         {PROVIDER CHARTING PREFERENCE:081916}  "

## 2019-02-28 ENCOUNTER — OFFICE VISIT (OUTPATIENT)
Dept: FAMILY MEDICINE | Facility: CLINIC | Age: 84
End: 2019-02-28
Payer: MEDICARE

## 2019-02-28 ENCOUNTER — ANCILLARY PROCEDURE (OUTPATIENT)
Dept: GENERAL RADIOLOGY | Facility: CLINIC | Age: 84
End: 2019-02-28
Attending: PHYSICIAN ASSISTANT
Payer: MEDICARE

## 2019-02-28 VITALS
TEMPERATURE: 97.6 F | HEIGHT: 68 IN | WEIGHT: 199.1 LBS | OXYGEN SATURATION: 95 % | HEART RATE: 76 BPM | SYSTOLIC BLOOD PRESSURE: 130 MMHG | BODY MASS INDEX: 30.17 KG/M2 | DIASTOLIC BLOOD PRESSURE: 62 MMHG | RESPIRATION RATE: 14 BRPM

## 2019-02-28 DIAGNOSIS — R35.0 BENIGN PROSTATIC HYPERPLASIA WITH URINARY FREQUENCY: ICD-10-CM

## 2019-02-28 DIAGNOSIS — R05.3 PERSISTENT COUGH FOR 3 WEEKS OR LONGER: ICD-10-CM

## 2019-02-28 DIAGNOSIS — N40.1 BENIGN PROSTATIC HYPERPLASIA WITH URINARY FREQUENCY: ICD-10-CM

## 2019-02-28 DIAGNOSIS — R06.2 WHEEZE: ICD-10-CM

## 2019-02-28 DIAGNOSIS — K11.20 INFECTION OF PAROTID GLAND: ICD-10-CM

## 2019-02-28 DIAGNOSIS — M19.011 PRIMARY OSTEOARTHRITIS OF BOTH SHOULDERS: ICD-10-CM

## 2019-02-28 DIAGNOSIS — M19.012 PRIMARY OSTEOARTHRITIS OF BOTH SHOULDERS: ICD-10-CM

## 2019-02-28 DIAGNOSIS — Z00.00 WELLNESS EXAMINATION: Primary | ICD-10-CM

## 2019-02-28 PROCEDURE — 71046 X-RAY EXAM CHEST 2 VIEWS: CPT | Mod: FY

## 2019-02-28 PROCEDURE — 99214 OFFICE O/P EST MOD 30 MIN: CPT | Mod: 25 | Performed by: PHYSICIAN ASSISTANT

## 2019-02-28 PROCEDURE — G0439 PPPS, SUBSEQ VISIT: HCPCS | Performed by: PHYSICIAN ASSISTANT

## 2019-02-28 RX ORDER — PREDNISONE 20 MG/1
40 TABLET ORAL DAILY
Qty: 10 TABLET | Refills: 0 | Status: SHIPPED | OUTPATIENT
Start: 2019-02-28 | End: 2020-08-24

## 2019-02-28 ASSESSMENT — MIFFLIN-ST. JEOR: SCORE: 1507.61

## 2019-02-28 ASSESSMENT — ACTIVITIES OF DAILY LIVING (ADL): CURRENT_FUNCTION: BATHING REQUIRES ASSISTANCE

## 2019-02-28 NOTE — PROGRESS NOTES
"SUBJECTIVE:   Eddie Stephens is a 89 year old male who presents for Preventive Visit.  Are you in the first 12 months of your Medicare coverage?  No    Annual Wellness Visit     In general, how would you rate your overall health?  Good    Frequency of exercise:  1 day/week    Duration of exercise:  Other    Do you usually eat at least 4 servings of fruit and vegetables a day, include whole grains    & fiber and avoid regularly eating high fat or \"junk\" foods?  Yes    Taking medications regularly:  Yes    Medication side effects:  Not applicable    Ability to successfully perform activities of daily living:  Bathing requires assistance    Home Safety:  No safety concerns identified    Hearing Impairment:  No hearing concerns (wears hearing aids )    In the past 6 months, have you been bothered by leaking of urine? Yes   In general, how would you rate your overall mental or emotional health?  Good    PHQ-2 Total Score:    Additional concerns today:  Yes (cough/cold symptoms )    [facility]  [facility] was there earlier in the fall of 2018        -Tyshawn has had a cough now for the past 2 months  -Started with a simple cold, without fever   -nasal congestion, pnd, etc   -all symptoms have improved but the cough remains  -he has occasional production  -does find that normal activities are slightly more difficult than normal  -did have wheezing around 6-7 years ago and rec'd an inhaler (albuterol)   -using around 2-3 puffs daily  -Did smoke for some time; pipe smoker; 20-25 years    Do you feel safe in your environment? Yes    Do you have a Health Care Directive? Yes: Advance Directive has been received and scanned.      Fall risk  Fallen 2 or more times in the past year?: Yes  Any fall with injury in the past year?: No    Cognitive Screening   1) Repeat 3 items (Leader, Season, Table)    2) Clock draw: NORMAL  3) 3 item recall: Recalls 3 objects  Results: 3 items recalled: COGNITIVE " IMPAIRMENT LESS LIKELY    Mini-CogTM Copyright MELONIE Leggett. Licensed by the author for use in [facility]; reprinted with permission (john@.[email or web address]). All rights reserved.      Do you have sleep apnea, excessive snoring or daytime drowsiness?: yes    Reviewed and updated as needed this visit by clinical staff  Tobacco  Allergies  Meds  Med Hx  Surg Hx  Fam Hx  Soc Hx        Reviewed and updated as needed this visit by Provider        Social History     Tobacco Use     Smoking status: Former Smoker     Smokeless tobacco: Never Used   Substance Use Topics     Alcohol use: Not on file       No flowsheet data found.      Current providers sharing in care for this patient include:   Patient Care Team:  Thom Cruz PA-C as PCP - General (Physician Assistant)  Thom Cruz PA-C as PCP - Assigned PCP  Thom Cruz PA-C as Physician Assistant (Physician Assistant)    The following health maintenance items are reviewed in Epic and correct as of today:  Health Maintenance   Topic Date Due     ADVANCE DIRECTIVE PLANNING Q5 YRS  06/25/1977     MEDICARE ANNUAL WELLNESS VISIT  06/25/1987     ZOSTER IMMUNIZATION (2 of 3) 10/08/2009     FALL RISK ASSESSMENT  07/11/2018     PHQ-2 Q1 YR  02/28/2020     DTAP/TDAP/TD IMMUNIZATION (3 - Td) 08/23/2026     INFLUENZA VACCINE  Completed     IPV IMMUNIZATION  Aged Out     MENINGITIS IMMUNIZATION  Aged Out     Labs reviewed in EPIC  Pneumonia Vaccine: up to date  SHINGRIX: considering    Review of Systems  CONSTITUTIONAL: NEGATIVE for fever, chills, change in weight  ENT/MOUTH: NEGATIVE for ear, mouth and throat problems  RESP:POSITIVE for cough-productive ongoing for 2 months  CV: NEGATIVE for chest pain, palpitations or peripheral edema  : positive for and frequency; improved on desmopressin  MUSCULOSKELETAL: POSITIVE  for left shoulder pain    OBJECTIVE:   /62   Pulse 76   Temp 97.6  F (36.4  C) (Tympanic)   Resp 14   Ht 1.727 m (5'  "8\")   Wt 90.3 kg (199 lb 1.6 oz)   SpO2 95%   BMI 30.27 kg/m   Estimated body mass index is 30.27 kg/m  as calculated from the following:    Height as of this encounter: 1.727 m (5' 8\").    Weight as of this encounter: 90.3 kg (199 lb 1.6 oz).  Physical Exam  GENERAL: healthy, alert and no distress  HENT: ear canals and TM's normal, nose and mouth without ulcers or lesions  NECK: no jvd  RESP: there is a wet cough; expiratory wheeze and rhocnhi throughout  CV: regular rates and rhythm; harsh systolic murmur best at rusb  MS: there is limited pitting to bilateral extremities  NEURO: Normal strength and tone, mentation intact and speech normal  PSYCH: mentation appears normal, affect normal/bright    Diagnostic Test Results:  CXR - unremarkable for acute infection or fluid; await radiology    ASSESSMENT / PLAN:   1. Wellness examination  Reviewed AWE recommendations including vaccines (shingrix) and will update his code status. [facility]     2. Persistent cough for 3 weeks or longer  He has had cough now following a simple upper respiratory illness for nearly two months. There is no fever or weight loss. There is rhonchi/wheeze to present similar to COPD flare. He does have significant smoking history. He is on clindamycin per his dentist and I am hesitant to change abx though im not sure we're getting the best respiratory coverage. We reviewed at length how to use his previously prescribed albuterol and I will add steroid. They can continue otc. He should have close follow up if not imprvoing  - XR Chest 2 Views; Future  - predniSONE (DELTASONE) 20 MG tablet; Take 40 mg by mouth daily.  Dispense: 10 tablet; Refill: 0    3. Wheeze  See #2  - predniSONE (DELTASONE) 20 MG tablet; Take 40 mg by mouth daily.  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 Dx both times by his dentist. Will have ENT exam and consider any further steps. " "Furthermore, this patient is on clindamycin; we discussed c diff side effect and he will monitor this  - OTOLARYNGOLOGY REFERRAL    5. Primary osteoarthritis of both shoulders  He does take tramadol for this at night; ok to consider tylenol during the diay    6. Benign prostatic hyperplasia with urinary frequency  For now he does feel that desmopressin has been helping. Will Continue present management but follow up with Dr. Weiner as needed.       End of Life Planning:  Patient currently has an advanced directive: Yes.  Practitioner is supportive of decision.    COUNSELING:  Reviewed preventive health counseling, as reflected in patient instructions    BP Readings from Last 1 Encounters:   02/28/19 130/62     Estimated body mass index is 30.27 kg/m  as calculated from the following:    Height as of this encounter: 1.727 m (5' 8\").    Weight as of this encounter: 90.3 kg (199 lb 1.6 oz).    BP Screening:   Last 3 BP Readings:    BP Readings from Last 3 Encounters:   02/28/19 130/62   09/01/17 130/72   07/17/17 130/80       The following was recommended to the patient:  Re-screen BP within a year and recommended lifestyle modifications       reports that he has quit smoking. he has never used smokeless tobacco.      Appropriate preventive services were discussed with this patient, including applicable screening as appropriate for cardiovascular disease, diabetes, osteopenia/osteoporosis, and glaucoma.  As appropriate for age/gender, discussed screening for colorectal cancer, prostate cancer, breast cancer, and cervical cancer. Checklist reviewing preventive services available has been given to the patient.    Reviewed patients plan of care and provided an AVS. The Basic Care Plan (routine screening as documented in Health Maintenance) for Eddie meets the Care Plan requirement. This Care Plan has been established and reviewed with the Patient and spouse and daughter.    Counseling Resources:  ATP IV " Guidelines  Pooled Cohorts Equation Calculator  Breast Cancer Risk Calculator  FRAX Risk Assessment  ICSI Preventive Guidelines  Dietary Guidelines for Americans, 2010  [employer] MyPlate  ASA Prophylaxis  Lung CA Screening    Thom Cruz PA-C  [facility]

## 2019-02-28 NOTE — PATIENT INSTRUCTIONS
Please consider the SHINGLES vaccine (SHINGRIX)    Monitor for any diarrhea while taking the clindamycin!        Preventive Health Recommendations:     See your health care provider every year to    Review health changes.     Discuss preventive care.      Review your medicines if your doctor has prescribed any.    Talk with your health care provider about whether you should have a test to screen for prostate cancer (PSA).    Every 3 years, have a diabetes test (fasting glucose). If you are at risk for diabetes, you should have this test more often.    Every 5 years, have a cholesterol test. Have this test more often if you are at risk for high cholesterol or heart disease.     Every 10 years, have a colonoscopy. Or, have a yearly FIT test (stool test). These exams will check for colon cancer.    Talk to with your health care provider about screening for Abdominal Aortic Aneurysm if you have a family history of AAA or have a history of smoking.  Shots:     Get a flu shot each year.     Get a tetanus shot every 10 years.     Talk to your doctor about your pneumonia vaccines. There are now two you should receive - Pneumovax (PPSV 23) and Prevnar (PCV 13).    Talk to your pharmacist about a shingles vaccine.     Talk to your doctor about the hepatitis B vaccine.  Nutrition:     Eat at least 5 servings of fruits and vegetables each day.     Eat whole-grain bread, whole-wheat pasta and brown rice instead of white grains and rice.     Get adequate Calcium and Vitamin D.   Lifestyle    Exercise for at least 150 minutes a week (30 minutes a day, 5 days a week). This will help you control your weight and prevent disease.     Limit alcohol to one drink per day.     No smoking.     Wear sunscreen to prevent skin cancer.     See your dentist every six months for an exam and cleaning.     See your eye doctor every 1 to 2 years to screen for conditions such as glaucoma, macular degeneration and cataracts.    Personalized  Prevention Plan  You are due for the preventive services outlined below.  Your care team is available to assist you in scheduling these services.  If you have already completed any of these items, please share that information with your care team to update in your medical record.

## 2019-03-02 ENCOUNTER — NURSE TRIAGE (OUTPATIENT)
Dept: NURSING | Facility: CLINIC | Age: 84
End: 2019-03-02

## 2019-03-02 NOTE — TELEPHONE ENCOUNTER
"Susan, daughter, calls to ask some questions about prednisone; prescribed for 8 week cough.  Patient was started taking it on 5/29 but only took one tablet instead of two for the 40 mg dose.  He hast started taking 2 tabs today.  The other concern is flushing.  Patient has facial flushing and body looks more pink than usual too.  Patient is not having any difficulty with breathing, hypertension, mood changes, edema or skin rash.  They will continue to monitor for symptoms.  FNA advised to call back with concerns/questions.      Additional Information    Negative: Drug overdose and nurse unable to answer question    Negative: Caller requesting information not related to medicine    Negative: Caller requesting a prescription for Strep throat and has a positive culture result    Negative: Rash while taking a medication or within 3 days of stopping it    Negative: Immunization reaction suspected    Negative: [1] Asthma and [2] having symptoms of asthma (cough, wheezing, etc)    Negative: MORE THAN A DOUBLE DOSE of a prescription or over-the-counter (OTC) drug    Negative: [1] DOUBLE DOSE (an extra dose or lesser amount) of over-the-counter (OTC) drug AND [2] any symptoms (e.g., dizziness, nausea, pain, sleepiness)    Negative: [1] DOUBLE DOSE (an extra dose or lesser amount) of prescription drug AND [2] any symptoms (e.g., dizziness, nausea, pain, sleepiness)    Negative: Took another person's prescription drug    Negative: [1] DOUBLE DOSE (an extra dose or lesser amount) of prescription drug AND [2] NO symptoms (Exception: a double dose of antibiotics)    Negative: Diabetes drug error or overdose (e.g., insulin or extra dose)    Negative: [1] Request for URGENT new prescription or refill of \"essential\" medication (i.e., likelihood of harm to patient if not taken) AND [2] triager unable to fill per unit policy    Negative: [1] Prescription not at pharmacy AND [2] was prescribed today by PCP    Negative: Pharmacy " calling with prescription questions and triager unable to answer question    Negative: Caller has URGENT medication question about med that PCP prescribed and triager unable to answer question    Negative: Caller has NON-URGENT medication question about med that PCP prescribed and triager unable to answer question    Negative: Caller requesting a NON-URGENT new prescription or refill and triager unable to refill per unit policy    Negative: Caller has medication question about med not prescribed by PCP and triager unable to answer question (e.g., compatibility with other med, storage)    Negative: [1] DOUBLE DOSE (an extra dose or lesser amount) of over-the-counter (OTC) drug AND [2] NO symptoms (all triage questions negative)    Negative: [1] DOUBLE DOSE (an extra dose or lesser amount) of antibiotic drug AND [2] NO symptoms (all triage questions negative)    Caller has medication question only, adult not sick, and triager answers question    Protocols used: [facility]STION CALL[facility]

## 2019-03-04 ENCOUNTER — TELEPHONE (OUTPATIENT)
Dept: FAMILY MEDICINE | Facility: CLINIC | Age: 84
End: 2019-03-04

## 2019-03-04 NOTE — TELEPHONE ENCOUNTER
"FYI:  Pt's daughter called to inform , that the pt's respiratory symptoms have significantly improved, but pt has been experiencing significant \"jitteriness\" from the prednisone, the past couple of days.  The pt has completed the prednisone therapy.  She request that the pt not be placed on prednisone in the future, due to the jitteriness.    Yazmin Pacheco RN    "

## 2019-05-13 ENCOUNTER — TRANSFERRED RECORDS (OUTPATIENT)
Dept: HEALTH INFORMATION MANAGEMENT | Facility: CLINIC | Age: 84
End: 2019-05-13

## 2019-06-25 ENCOUNTER — TRANSFERRED RECORDS (OUTPATIENT)
Dept: HEALTH INFORMATION MANAGEMENT | Facility: CLINIC | Age: 84
End: 2019-06-25

## 2020-03-02 ENCOUNTER — MYC MEDICAL ADVICE (OUTPATIENT)
Dept: FAMILY MEDICINE | Facility: CLINIC | Age: 85
End: 2020-03-02

## 2020-03-10 ENCOUNTER — HEALTH MAINTENANCE LETTER (OUTPATIENT)
Age: 85
End: 2020-03-10

## 2020-08-03 ENCOUNTER — TRANSFERRED RECORDS (OUTPATIENT)
Dept: HEALTH INFORMATION MANAGEMENT | Facility: CLINIC | Age: 85
End: 2020-08-03

## 2020-08-21 NOTE — PROGRESS NOTES
"Eddie Stephens is a 89 year old male who is being evaluated via a billable video visit.      The patient has been notified of following:     \"This video visit will be conducted via a call between you and your physician/provider. We have found that certain health care needs can be provided without the need for an in-person physical exam.  This service lets us provide the care you need with a video conversation.  If a prescription is necessary we can send it directly to your pharmacy.  If lab work is needed we can place an order for that and you can then stop by our lab to have the test done at a later time.    Video visits are billed at different rates depending on your insurance coverage.  Please reach out to your insurance provider with any questions.    If during the course of the call the physician/provider feels a video visit is not appropriate, you will not be charged for this service.\"    Patient has given verbal consent for Video visit? Yes  How would you like to obtain your AVS? MyChart  If you are dropped from the video visit, the video invite should be resent to: Text to cell phone: [phone]  Will anyone else be joining your video visit? Yes, daughter will be on same video visit with patient.       Subjective     Eddie Stephens is a 89 year old male who presents today via video visit for the following health issues:    HPI    Annual Wellness Visit    Are you in the first 12 months of your Medicare Part B coverage?  No    Physical Health:    In general, how would you rate your overall physical health? good    Outside of work, how many days during the week do you exercise?4-5 days/week    Outside of work, approximately how many minutes a day do you exercise?15-30 minutes    If you drink alcohol do you typically have >3 drinks per day or >7 drinks per week? No    Do you usually eat at least 4 servings of fruit and vegetables a day, include whole grains & fiber and avoid regularly eating high fat or \"junk\" " foods? NO    Do you have any problems taking medications regularly? No    Do you have any side effects from medications? none    Needs assistance for the following daily activities: Spouse assists patient daily with all activities, patient lives in assisted living.     Which of the following safety concerns are present in your home?  none identified     Hearing impairment: Yes, bilateral hearing loss, wears hearing aids.     In the past 6 months, have you been bothered by leaking of urine? yes    Mental Health:    In general, how would you rate your overall mental or emotional health? good  PHQ-2 Score:      Do you feel safe in your environment? Yes    Have you ever done Advance Care Planning? (For example, a Health Directive, POLST, or a discussion with a medical provider or your loved ones about your wishes)? Yes, patient states has an Advance Care Planning document and will bring a copy to the clinic.     REVIEWED THIS AND POLST TODAY    Fall risk:  Fallen 2 or more times in the past year?: Yes  Any fall with injury in the past year?: Yes  Consider get up and go later in clinic      Cognitive Screenin) Repeat 3 items (Leader, Season, Table)    2) Clock draw: NORMAL  3) 3 item recall: Recalls 2 objects   Results: NORMAL clock, 1-2 items recalled: COGNITIVE IMPAIRMENT LESS LIKELY    Mini-CogTM Copyright S Oleksandr. Licensed by the author for use in [facility]; reprinted with permission (john@[facility]). All rights reserved.      Do you have sleep apnea, excessive snoring or daytime drowsiness?: yes    Current providers sharing in care for this patient include:   Patient Care Team:  Thom Cruz PA-C as PCP - General (Physician Assistant)  Thom Cruz PA-C as Physician Assistant (Physician Assistant)  Thom Cruz PA-C as Assigned PCP     Eddie Stephens is a 89 year old male who presents today for vitrual wellness check  Did get SHINGRIX - 6/3/19; 8/15/19  Get [id number]  "PV23  We will update these      Did have a few falls this past year  At one point was taking 100mg of tramadol in the evening  Now weaning off; on to tylenol and diclofenac  BOth times were related to change of position/movement    Does use bathroom a few times each night; on desmopressin    Had a swallowing episode a month or two back - one of his pills was stuck in his throat; Anything after that wouldn't quite go down  Had to swallow water and daughter gave him fish oil to help  Eventually coughed a bit up but was able to get things down afterwards and since then no problems       His wife has noted some increased swelling in the ankles at times  More in the evenings[facility]  [facility] started on FARZANA stockings; elevating legs when he can  Does not seem to be progressing  Denies overt shortness of breath     Saw ENT for parotid gland swelling - Dr. Dinh  Apparently had imaging - \"filled with fat\"  Has had a few episodes of \"flares\" that were treated with amoxicillin  They are hoping I will fill this in the future (preferably without an in clinic appt)      Video Start Time: 1050    Review of Systems   Constitutional, HEENT, cardiovascular, pulmonary, GI, , musculoskeletal, neuro, skin, endocrine and psych systems are negative, except as otherwise noted.      Objective           Vitals:  No vitals were obtained today due to virtual visit.    Physical Exam     GENERAL: Healthy, alert and no distress  EYES: Eyes grossly normal to inspection.  No discharge or erythema, or obvious scleral/conjunctival abnormalities.  RESP: No audible wheeze, cough, or visible cyanosis.  No visible retractions or increased work of breathing.    NEURO: Cranial nerves grossly intact.  Mentation and speech appropriate for age.  PSYCH: Mentation appears normal, affect normal/bright, judgement and insight intact, normal speech and appearance well-groomed.          " "    Assessment & Plan     Wellness examination  Reviewed personal and family history. Reviewed age appropriate screenings. Recommended any needed vaccinations - he is up to date on these.    Infection of parotid gland  They did see Dr. Peña - gland has \"turned to fat\"; occasionally flaring infection. Hoping I will treat in these cases. DO recommend even some virtual visit for this but ok to do. Used amoxicillin    Swelling of lower extremity  Relatively new. Not increasing. IMproved in the mornings. Is using stockings. Denies kelly shortness of breath or weight gain. Continue current regimen    Obstructive sleep apnea syndrome  Continue on CPAP therapy    Benign prostatic hyperplasia with nocturia  Does find himself getting up in the middle of the night more regularly. Discussed trigger fluids which really are limited. Consider limiting fluids prior to bed time and planned voiding. Stay on current medications; sees urology    Gastroesophageal reflux disease, esophagitis presence not specified  Did have one episode of pill getting stuck but otherwise symptom free. Monitor    Personal history of fall  Restless legs syndrome  Unclear if related to tramadol for his falls but [facility] - down to 50mg tramadol nightly. Discussed slowed, careful changes in position    Advanced care planning/counseling discussion  Reviewed his ACP and POLST over the video chat.        Return in about 1 year (around 8/24/2021).    Thom Cruz PA-C  [facility]      Video-Visit Details    Type of service:  Video Visit    Video End Time:11:31 AM    Originating Location (pt. Location): Assisted Living    Distant Location (provider location):  [facility]     Platform used for Video Visit: [facility]          The patient's PHQ-9 score is consistent with mild depression. He was provided with information regarding depression and was advised to schedule a follow up appointment in 53 weeks " to further address this issue.  He is at risk for falling and has been provided with information to reduce the risk of falling at home.

## 2020-08-24 ENCOUNTER — VIRTUAL VISIT (OUTPATIENT)
Dept: FAMILY MEDICINE | Facility: CLINIC | Age: 85
End: 2020-08-24
Payer: MEDICARE

## 2020-08-24 DIAGNOSIS — G25.81 RESTLESS LEGS SYNDROME: ICD-10-CM

## 2020-08-24 DIAGNOSIS — G47.33 OBSTRUCTIVE SLEEP APNEA SYNDROME: ICD-10-CM

## 2020-08-24 DIAGNOSIS — K11.20 INFECTION OF PAROTID GLAND: ICD-10-CM

## 2020-08-24 DIAGNOSIS — Z00.00 ENCOUNTER FOR MEDICARE ANNUAL WELLNESS EXAM: ICD-10-CM

## 2020-08-24 DIAGNOSIS — Z91.81 PERSONAL HISTORY OF FALL: ICD-10-CM

## 2020-08-24 DIAGNOSIS — Z71.89 ADVANCED CARE PLANNING/COUNSELING DISCUSSION: ICD-10-CM

## 2020-08-24 DIAGNOSIS — R35.1 BENIGN PROSTATIC HYPERPLASIA WITH NOCTURIA: ICD-10-CM

## 2020-08-24 DIAGNOSIS — Z00.00 WELLNESS EXAMINATION: Primary | ICD-10-CM

## 2020-08-24 DIAGNOSIS — M79.89 SWELLING OF LOWER EXTREMITY: ICD-10-CM

## 2020-08-24 DIAGNOSIS — K21.9 GASTROESOPHAGEAL REFLUX DISEASE, ESOPHAGITIS PRESENCE NOT SPECIFIED: ICD-10-CM

## 2020-08-24 DIAGNOSIS — N40.1 BENIGN PROSTATIC HYPERPLASIA WITH NOCTURIA: ICD-10-CM

## 2020-08-24 PROCEDURE — 99213 OFFICE O/P EST LOW 20 MIN: CPT | Mod: 25 | Performed by: PHYSICIAN ASSISTANT

## 2020-08-24 PROCEDURE — G0439 PPPS, SUBSEQ VISIT: HCPCS | Mod: 95 | Performed by: PHYSICIAN ASSISTANT

## 2020-08-24 RX ORDER — METHOCARBAMOL 500 MG/1
500 TABLET, FILM COATED ORAL AT BEDTIME
COMMUNITY
End: 2022-01-17

## 2020-08-24 RX ORDER — BENZONATATE 100 MG/1
100 CAPSULE ORAL EVERY 6 HOURS
COMMUNITY
End: 2021-05-13

## 2020-08-24 ASSESSMENT — PATIENT HEALTH QUESTIONNAIRE - PHQ9: SUM OF ALL RESPONSES TO PHQ QUESTIONS 1-9: 6

## 2020-08-24 NOTE — PATIENT INSTRUCTIONS
At your visit today, we discussed your risk for falls and preventive options.    Fall Prevention  Falls often occur due to slipping, tripping or losing your balance. Millions of people fall every year and injure themselves. Here are ways to reduce your risk of falling again.    Think about your fall, was there anything that caused your fall that can be fixed, removed, or replaced?    Make your home safe by keeping walkways clear of objects you may trip over, such as electric cords.    Use non-slip pads under rugs. Don't use area rugs or small throw rugs.    Use non-slip mats in bathtubs and showers.    Install handrails and lights on staircases. The handrails should be on both sides of the stairs.    Don't walk in poorly lit areas.    Don't stand on chairs or wobbly ladders.    Use caution when reaching overhead or looking upward. This position can cause a loss of balance.    Be sure your shoes fit properly, have non-slip bottoms and are in good condition.     Wear shoes both inside and out. Don't go barefoot or wear slippers.    Be cautious when going up and down stairs, curbs, and when walking on uneven sidewalks.    If your balance is poor, consider using a cane or walker.    If your fall was related to alcohol use, stop or limit alcohol intake.     If your fall was related to use of sleeping medicines, talk to your healthcare provider about this. You may need to reduce your dosage at bedtime if you awaken during the night to go to the bathroom.      To reduce the need for nighttime bathroom trips:  ? Don't drink fluids for several hours before going to bed  ? Empty your bladder before going to bed  ? Men can keep a urinal at the bedside    Stay as active as you can. Balance, flexibility, strength, and endurance all come from exercise. They all play a role in preventing falls. Ask your healthcare provider which types of activity are right for you.    Get your vision checked on a regular basis.    If you have  pets, know where they are before you stand up or walk so you don't trip over them.    Use night lights.    Go over all your medicines with a pharmacist or other healthcare provider to see if any of them could make you more likely to fall.  Date Last Reviewed: 4/1/2018 2000-2019 [employer]. [address] [place] [address], [place] [address]. All rights reserved. This information is not intended as a substitute for professional medical care. Always follow your healthcare professional's instructions.        Patient Education   Personalized Prevention Plan  You are due for the preventive services outlined below.  Your care team is available to assist you in scheduling these services.  If you have already completed any of these items, please share that information with your care team to update in your medical record.  Health Maintenance Due   Topic Date Due     Pneumococcal Vaccine (1 of 2 - PCV13) 06/25/1987     Zoster (Shingles) Vaccine (2 of 3) 10/08/2009     Flu Vaccine (1) 09/01/2020       Depression and Suicide in Older Adults    Nearly 2 million older Americans have some type of depression. Sadly, some of them even take their own lives. Yet depression among older adults is often ignored. Learn the warning signs. You may help spare a loved one needless pain. You may also save a life.  What is depression?  Depression is a serious illness that affects the way you think and feel. It is not a normal part of aging, nor is it a sign of weakness, a character flaw, or something you can snap out of. Most people with depression need treatment to get better. The most common symptom is a feeling of deep sadness. People who are depressed also may seem tired and listless. And nothing seems to give them pleasure. It s normal to grieve or be sad sometimes. But sadness lessens or passes with time. Depression rarely goes away or improves on its own. Other symptoms of depression are:    Sleeping more or less than  normal    Eating more or less than normal    Having headaches, stomachaches, or other pains that don t go away    Feeling nervous,  empty,  or worthless    Crying a great deal    Thinking or talking about suicide or death    Feeling confused or forgetful  What causes it?  The causes of depression aren t fully known. But, it is thought to result from a complex interaction of biochemistry, genetics, environmental factors, and personality. Certain chemicals in the brain play a role. Depression does run in families. And life stresses can also trigger depression in some people. Older adults often face many stressors, such as death of friends or a spouse, health problems, and financial concerns.  How you can help  Often, depressed people may not want to ask for help. When they do, they may be ignored. Or, they may receive the wrong treatment. You can help by showing parents and older friends love and support. If they seem depressed, help them find the right treatment. Talk to your doctor. Or contact a local mental health center, social service agency, or hospital. With modern treatment, no one has to suffer from depression.  If your older friend or family member agrees, you can be an advocate for him or her in the healthcare setting. Many times, older adults have other chronic illnesses such as diabetes, heart disease, or cancer that can cause symptoms of depression. Medicine side effects can also contribute to certain behaviors and feelings. It is important that the older adult's healthcare provider listens and sorts out the causes of any symptoms of depression and makes referrals to mental health specialists when needed. Untreated depression can result in misdiagnosis, including brain disorders such as dementia and Alzheimer's. If the health professional does not take the issue of depression seriously, ask your family member or friend to consider finding another provider.  Resources    National Suicide Prevention  Lifeline (crisis hotline)250-139-YVEK (8255)    National [place] of Mental Exbfxx004-063-0843fgp.[facility].nih.gov    National [place] on Mental Wlsmlru188-746-2030gwv.frank.org    Mental Health Slbxbhb068-256-6561cnb.[facility].org    National Suicide Zuiabfw882-184-9304 (800-SUICIDE)   Date Last Reviewed: 2/1/2017 2000-2019 [employer]. [address] [place] [address]. All rights reserved. This information is not intended as a substitute for professional medical care. Always follow your healthcare professional's instructions.          Preventing Falls in the Home  An adult or child can fall for many reasons. If you are an older adult, you may fall because your reaction time slows down. Your muscles and joints may get stiff, weak, or less flexible because of illness, medicines, or a physical condition. These things can also make a child more likely to fall or be injured in a fall.  Other health problems that make falls more likely include:    Arthritis    Dizziness or lightheadedness when you get out of bed (orthostatic hypotension)    History of a stroke    Dizziness    Anemia    Certain medicines taken for mental illness or to control blood pressure.    Problems with balance or gait    Bladder or urinary problems    History of falls with or without an injury    Changes in vision (vision impairment)    Changes in thinking skills and memory (cognitive impairment)  Injuries from a fall can include broken bones, dislocated joints, internal bleeding and cuts. When these injuries are serious enough, they can make it impossible for you or a child who is injured in a fall to live on his or her ownhome.  Prevention tips  To help prevent falls and fall-related injuries, follow the tips below.   Floors  Make floors safer by doing the following:     Put nonskid pads under area rugs.    Remove throw rugs.    Replace worn floor coverings.    Tack carpets firmly to each step on carpeted stairs. Put nonskid  strips on the edges of uncarpeted stairs.    Keep floors and stairs free of clutter and cords.    Arrange furniture so there are clear pathways.    Clean up any spills right away.    Wear shoes that fit.  Bathrooms    Make bathrooms safer by doing the following:     Install grab bars in the tub or shower.    Apply nonskid strips or put a nonskid rubber mat in the tub or shower.    Sit on a bath chair to bathe.    Use bathmats with nonskid backing.  Lighting and the environment  Improve lighting in your home by doing the following:     Keep a flashlight in each room. Or put a lamp next to the bed within easy reach.    Put nightlights in the bedrooms, hallways, kitchen, and bathrooms.    Make sure all stairways have good lighting.    Take your time when going up and down stairs.    Put handrails on both sides of stairs and in walkways for more support. To prevent injury to your wrist or arm, don t use handrails to pull yourself up.    Install grab bars to pull yourself up.    Move or rearrange items that you use often. This will make them easier to find or reach.    Look at your home to find any safety hazards. Especially look at doorways, walkways, and the driveway. Remove or repair any safety problems that you find.  Date Last Reviewed: 8/1/2016 2000-2019 [employer]. [address] [facility], [place] [address]. All rights reserved. This information is not intended as a substitute for professional medical care. Always follow your healthcare professional's instructions.

## 2020-08-26 PROBLEM — R35.1 BENIGN PROSTATIC HYPERPLASIA WITH NOCTURIA: Status: ACTIVE | Noted: 2017-07-14

## 2020-09-22 ENCOUNTER — RECORDS - HEALTHEAST (OUTPATIENT)
Dept: LAB | Facility: CLINIC | Age: 85
End: 2020-09-22

## 2020-09-22 LAB
BASOPHILS # BLD AUTO: 0 THOU/UL (ref 0–0.2)
BASOPHILS NFR BLD AUTO: 1 % (ref 0–2)
EOSINOPHIL # BLD AUTO: 0.1 THOU/UL (ref 0–0.4)
EOSINOPHIL NFR BLD AUTO: 2 % (ref 0–6)
ERYTHROCYTE [DISTWIDTH] IN BLOOD BY AUTOMATED COUNT: 14.9 % (ref 11–14.5)
HCT VFR BLD AUTO: 28.2 % (ref 40–54)
HGB BLD-MCNC: 9.5 G/DL (ref 14–18)
IMM GRANULOCYTES # BLD: 0 THOU/UL
IMM GRANULOCYTES NFR BLD: 0 %
LYMPHOCYTES # BLD AUTO: 1.5 THOU/UL (ref 0.8–4.4)
LYMPHOCYTES NFR BLD AUTO: 28 % (ref 20–40)
MCH RBC QN AUTO: 30.4 PG (ref 27–34)
MCHC RBC AUTO-ENTMCNC: 33.7 G/DL (ref 32–36)
MCV RBC AUTO: 90 FL (ref 80–100)
MONOCYTES # BLD AUTO: 0.6 THOU/UL (ref 0–0.9)
MONOCYTES NFR BLD AUTO: 12 % (ref 2–10)
NEUTROPHILS # BLD AUTO: 3 THOU/UL (ref 2–7.7)
NEUTROPHILS NFR BLD AUTO: 57 % (ref 50–70)
PLATELET # BLD AUTO: 158 THOU/UL (ref 140–440)
PMV BLD AUTO: 9.7 FL (ref 8.5–12.5)
RBC # BLD AUTO: 3.12 MILL/UL (ref 4.4–6.2)
WBC: 5.2 THOU/UL (ref 4–11)

## 2020-09-24 ENCOUNTER — MYC MEDICAL ADVICE (OUTPATIENT)
Dept: FAMILY MEDICINE | Facility: CLINIC | Age: 85
End: 2020-09-24

## 2020-09-26 LAB
ANION GAP SERPL CALCULATED.3IONS-SCNC: 7 MMOL/L (ref 5–18)
BNP SERPL-MCNC: 68 PG/ML (ref 0–93)
BUN SERPL-MCNC: 19 MG/DL (ref 8–28)
CALCIUM SERPL-MCNC: 8.6 MG/DL (ref 8.5–10.5)
CHLORIDE BLD-SCNC: 97 MMOL/L (ref 98–107)
CO2 SERPL-SCNC: 21 MMOL/L (ref 22–31)
CREAT SERPL-MCNC: 0.85 MG/DL (ref 0.7–1.3)
GFR SERPL CREATININE-BSD FRML MDRD: >60 ML/MIN/1.73M2
GLUCOSE BLD-MCNC: 86 MG/DL (ref 70–125)
IRON SATN MFR SERPL: 14 % (ref 20–50)
IRON SERPL-MCNC: 30 UG/DL (ref 42–175)
POTASSIUM BLD-SCNC: 4.7 MMOL/L (ref 3.5–5)
SODIUM SERPL-SCNC: 125 MMOL/L (ref 136–145)
TIBC SERPL-MCNC: 218 UG/DL (ref 313–563)
TRANSFERRIN SERPL-MCNC: 174 MG/DL (ref 212–360)

## 2020-09-30 NOTE — TELEPHONE ENCOUNTER
Wife called and is looking for the POLST form that was to be signed by Thom Cruz.  Please call the wife back [phone]  When done so she can pick it up.

## 2020-09-30 NOTE — TELEPHONE ENCOUNTER
Douglas Cruz is out of the office today.      Will forward to Douglas Cruz and Nancy Figueroa for advisal.

## 2020-10-01 NOTE — TELEPHONE ENCOUNTER
Tomeka - not sure if you needed this encounter or not to document. If not, please close.   Nancy Figueroa MA

## 2020-10-01 NOTE — TELEPHONE ENCOUNTER
Form completed and family notified.  They will  so was placed at the .       Copy sent to abstraction for placing in chart.

## 2020-10-02 ENCOUNTER — DOCUMENTATION ONLY (OUTPATIENT)
Dept: OTHER | Facility: CLINIC | Age: 85
End: 2020-10-02

## 2020-10-06 ENCOUNTER — RECORDS - HEALTHEAST (OUTPATIENT)
Dept: LAB | Facility: CLINIC | Age: 85
End: 2020-10-06

## 2020-10-06 ENCOUNTER — TRANSFERRED RECORDS (OUTPATIENT)
Dept: HEALTH INFORMATION MANAGEMENT | Facility: CLINIC | Age: 85
End: 2020-10-06

## 2020-10-06 LAB
BASOPHILS # BLD AUTO: 0 THOU/UL (ref 0–0.2)
BASOPHILS NFR BLD AUTO: 1 % (ref 0–2)
EOSINOPHIL # BLD AUTO: 0.1 THOU/UL (ref 0–0.4)
EOSINOPHIL NFR BLD AUTO: 3 % (ref 0–6)
ERYTHROCYTE [DISTWIDTH] IN BLOOD BY AUTOMATED COUNT: 14.8 % (ref 11–14.5)
HCT VFR BLD AUTO: 29.3 % (ref 40–54)
HGB BLD-MCNC: 9.7 G/DL (ref 14–18)
LYMPHOCYTES # BLD AUTO: 1.5 THOU/UL (ref 0.8–4.4)
LYMPHOCYTES NFR BLD AUTO: 31 % (ref 20–40)
MCH RBC QN AUTO: 30.4 PG (ref 27–34)
MCHC RBC AUTO-ENTMCNC: 33.1 G/DL (ref 32–36)
MCV RBC AUTO: 92 FL (ref 80–100)
MONOCYTES # BLD AUTO: 0.7 THOU/UL (ref 0–0.9)
MONOCYTES NFR BLD AUTO: 14 % (ref 2–10)
NEUTROPHILS # BLD AUTO: 2.5 THOU/UL (ref 2–7.7)
NEUTROPHILS NFR BLD AUTO: 52 % (ref 50–70)
OVALOCYTES: ABNORMAL
PATH REPORT.MICROSCOPIC SPEC OTHER STN: ABNORMAL
PLAT MORPH BLD: NORMAL
PLATELET # BLD AUTO: 159 THOU/UL (ref 140–440)
PMV BLD AUTO: 9.9 FL (ref 8.5–12.5)
RBC # BLD AUTO: 3.19 MILL/UL (ref 4.4–6.2)
REACTIVE LYMPHS: ABNORMAL
WBC: 4.8 THOU/UL (ref 4–11)

## 2020-10-07 LAB
LAB AP CHARGES (HE HISTORICAL CONVERSION): NORMAL
PATH REPORT.COMMENTS IMP SPEC: NORMAL
PATH REPORT.COMMENTS IMP SPEC: NORMAL
PATH REPORT.FINAL DX SPEC: NORMAL
PATH REPORT.MICROSCOPIC SPEC OTHER STN: NORMAL

## 2020-12-08 ENCOUNTER — TELEPHONE (OUTPATIENT)
Dept: FAMILY MEDICINE | Facility: CLINIC | Age: 85
End: 2020-12-08

## 2020-12-08 NOTE — TELEPHONE ENCOUNTER
Susan (daughter) calling to report Pt with constipation times 4 days.   Has tried Biscodyl 5 mg (2) today and (1) yesterday  glycerin supp prn  Metamucil daily    Adv to increase water intake, exercise, mirlax capful daily, senna/doc BID. If pt has abd pain, vomiting, unable to eat, etc then should be seen.     Magaly DUKES RN

## 2020-12-22 ENCOUNTER — MYC MEDICAL ADVICE (OUTPATIENT)
Dept: FAMILY MEDICINE | Facility: CLINIC | Age: 85
End: 2020-12-22

## 2020-12-22 ENCOUNTER — TELEPHONE (OUTPATIENT)
Dept: FAMILY MEDICINE | Facility: CLINIC | Age: 85
End: 2020-12-22

## 2020-12-22 ENCOUNTER — VIRTUAL VISIT (OUTPATIENT)
Dept: FAMILY MEDICINE | Facility: CLINIC | Age: 85
End: 2020-12-22
Payer: MEDICARE

## 2020-12-22 DIAGNOSIS — F41.9 ANXIETY: ICD-10-CM

## 2020-12-22 DIAGNOSIS — D64.9 ANEMIA, UNSPECIFIED TYPE: Primary | ICD-10-CM

## 2020-12-22 DIAGNOSIS — B37.0 THRUSH: ICD-10-CM

## 2020-12-22 DIAGNOSIS — F41.9 ANXIETY: Primary | ICD-10-CM

## 2020-12-22 PROCEDURE — 99443 PR PHYSICIAN TELEPHONE EVALUATION 21-30 MIN: CPT | Mod: 95 | Performed by: NURSE PRACTITIONER

## 2020-12-22 RX ORDER — FLUOXETINE 10 MG/1
10 CAPSULE ORAL DAILY
Qty: 90 CAPSULE | Refills: 0 | Status: SHIPPED | OUTPATIENT
Start: 2020-12-22 | End: 2021-01-06

## 2020-12-22 RX ORDER — CLOTRIMAZOLE 10 MG/1
10 LOZENGE ORAL
Qty: 70 EACH | Refills: 0 | Status: SHIPPED | OUTPATIENT
Start: 2020-12-22 | End: 2021-04-15

## 2020-12-22 NOTE — TELEPHONE ENCOUNTER
Patients daughter Sharon calling on C2C  The pharmacy recommended Lexapro or Zoloft instead of Prozac  Due to how it interacts with his Tramadol  Please advise  Ok to call and  [phone]

## 2020-12-22 NOTE — PROGRESS NOTES
"Eddie Stephens is a 89 year old male who is being evaluated via a billable telephone visit.      The patient has been notified of following:     \"This telephone visit will be conducted via a call between you and your physician/provider. We have found that certain health care needs can be provided without the need for a physical exam.  This service lets us provide the care you need with a short phone conversation.  If a prescription is necessary we can send it directly to your pharmacy.  If lab work is needed we can place an order for that and you can then stop by our lab to have the test done at a later time.    Telephone visits are billed at different rates depending on your insurance coverage. During this emergency period, for some insurers they may be billed the same as an in-person visit.  Please reach out to your insurance provider with any questions.    If during the course of the call the physician/provider feels a telephone visit is not appropriate, you will not be charged for this service.\"    Patient has given verbal consent for Telephone visit?  Yes    What phone number would you like to be contacted at? [phone]  How would you like to obtain your AVS? Rod    Subjective     Eddie Stephens is a 89 year old male who presents via phone visit today for the following health issues:    HPI     Possible Thrush See my chart pics, sleep issues    Daughter and wife on the phone as well.  Thick white coating on tongue noted.  Worsening.  He does utilize an inhaler.    Also, concerns with sleep disturbance.  Known RLS but unsure if this is the cause of his symptoms or if this is more anxiety based.  Daughter believes anxiety could be causing a significant role.  Up pacing quite a bit.  Has not been treated in the past.         Review of Systems   Constitutional, HEENT, cardiovascular, pulmonary, gi and gu systems are negative, except as otherwise noted.       Objective          Vitals:  No vitals were obtained " today due to virtual visit.    healthy, alert and no distress  PSYCH: Alert and oriented times 3; coherent speech, normal   rate and volume, able to articulate logical thoughts, able   to abstract reason, no tangential thoughts, no hallucinations   or delusions  His affect is normal  RESP: No cough, no audible wheezing, able to talk in full sentences  Remainder of exam unable to be completed due to telephone visits            Assessment/Plan:    Assessment & Plan     Anemia, unspecified type  Talk more with hematology.    - Oncology/Hematology Adult Referral; Future    Thrush  Discussed treatment.  - clotrimazole (MYCELEX) 10 MG lozenge; Place 1 lozenge (10 mg) inside cheek 5 times daily    Anxiety  Reviewed options.  Reviewed r/b/se.  Pt agrees with plan and verbalized understanding.  - FLUoxetine (PROZAC) 10 MG capsule; Take 1 capsule (10 mg) by mouth daily            No follow-ups on file.    FIOR Rosario Ra [facility]    Phone call duration:  22 minutes    Addendum  Changed from prozac to lexapro due to side effect profile and interactions.  ALEJANDRINA

## 2020-12-23 RX ORDER — ESCITALOPRAM OXALATE 10 MG/1
10 TABLET ORAL DAILY
Qty: 90 TABLET | Refills: 0 | Status: SHIPPED | OUTPATIENT
Start: 2020-12-23 | End: 2021-05-13

## 2020-12-23 NOTE — TELEPHONE ENCOUNTER
Kota w/ ALEJANDRINA    Ok to do Lexapro 10 mg daily if wanting to stay away from prozac.     They do NOT want to hold Tramadol- causes too many RLS symptoms to re occur    Magaly DUKES RN

## 2020-12-24 ENCOUNTER — TELEPHONE (OUTPATIENT)
Dept: FAMILY MEDICINE | Facility: CLINIC | Age: 85
End: 2020-12-24

## 2020-12-24 NOTE — TELEPHONE ENCOUNTER
Reason for Call:  Other prescription    Detailed comments: Pharmacy, [facility] prescription called in.  already picked up the Prozak.  Then today, another prescription for Lexapro was called in for the same patient. Pharmacist just wants to clarify what's going on because the 2 meds treat the same thing.    Phone Number Patient can be reached at: Other phone number:  Jacques Pharmacist, [phone]    Best Time: Anytime    Can we leave a detailed message on this number? NO    Call taken on 12/24/2020 at 12:21 PM by Laura Kanavel

## 2020-12-28 NOTE — TELEPHONE ENCOUNTER
Note not finished and Rx not prescribed previously by us. This is ok to wait until prescriber rtc to clarify

## 2020-12-28 NOTE — TELEPHONE ENCOUNTER
Called patient's daughter he is aware and is going to be taking the lexapro.     Sharri Manzo RN on 12/28/2020 at 9:00 AM

## 2021-01-04 NOTE — TELEPHONE ENCOUNTER
RECORDS STATUS - ALL OTHER DIAGNOSIS      RECORDS RECEIVED FROM: [facility]   DATE RECEIVED: 1/12/2021   NOTES STATUS DETAILS   OFFICE NOTE from referring provider Complete Esperanza Lee Ra, FIOR CNP   OFFICE NOTE from medical oncologist N/A    DISCHARGE SUMMARY from hospital N/A    DISCHARGE REPORT from the ER     OPERATIVE REPORT N/A    MEDICATION LIST Complete [facility]   CLINICAL TRIAL TREATMENTS TO DATE     LABS     PATHOLOGY REPORTS N/A    ANYTHING RELATED TO DIAGNOSIS Complete Labs last updated on 10/6/2020 [facility]    GENONOMIC TESTING     TYPE:     IMAGING (NEED IMAGES & REPORT)     CT SCANS     MRI     MAMMO     ULTRASOUND     PET

## 2021-01-12 ENCOUNTER — VIRTUAL VISIT (OUTPATIENT)
Dept: ONCOLOGY | Facility: CLINIC | Age: 86
End: 2021-01-12
Attending: NURSE PRACTITIONER
Payer: MEDICARE

## 2021-01-12 ENCOUNTER — PRE VISIT (OUTPATIENT)
Dept: ONCOLOGY | Facility: CLINIC | Age: 86
End: 2021-01-12

## 2021-01-12 VITALS — BODY MASS INDEX: 29.62 KG/M2 | WEIGHT: 200 LBS | HEIGHT: 69 IN

## 2021-01-12 DIAGNOSIS — D64.9 ANEMIA, UNSPECIFIED TYPE: ICD-10-CM

## 2021-01-12 PROCEDURE — 99204 OFFICE O/P NEW MOD 45 MIN: CPT | Mod: 95 | Performed by: INTERNAL MEDICINE

## 2021-01-12 ASSESSMENT — MIFFLIN-ST. JEOR: SCORE: 1517.57

## 2021-01-12 NOTE — NURSING NOTE
Tyshawn is a 89 year old who is being evaluated via a billable video visit.      How would you like to obtain your AVS? MyChart  Will anyone else be joining your video visit? Yes, patient's daughter, Susan will also be on the phone for the video visit.      Video-Visit Details    Type of service:  Video Visit    Originating Location (pt. Location): Home    Distant Location (provider location):  [facility]     Platform used for Video Visit: [facility]      SYMPTOM QUESTIONNAIRE    Pain: chronic osteoarthritis of shoulders, knees and back- takes 100mg of Tramadol at bedtime    Nausea/Vomiting: no    Mouth Sores: recently resolved thrush- was treated with clotrimazole Dec 22nd    Shortness of Breath: yes, with exertion    Smoking: no    Fever or Chills: no    Hard Stools: yes, uses suppository when needed    Soft Stools: no    Weight Loss: no    Weakness: yes and fatigue    Burning, numbness or tingling in hands or feet: no    Problems with skin or swelling: dry itchy skin    Memory Loss: no    Anxiety or Depression: no    Trouble Sleeping: yes, insomnia    Patient also complains of being cold all the time. Has bouts of RLS and uses CPAP, although hasn't been using lately as he is often awake throughout the night.    Shelley Brice LPN on 1/12/2021 at 3:56 PM

## 2021-01-12 NOTE — PROGRESS NOTES
Hematology initial visit:  Date on this visit: 1/12/2021    Eddie Stephens  is referred by Dr.Jenna Luis F Lee for a hematology consultation. He requires evaluation for Anemia.    Primary Physician: Thom Cruz         SYMPTOM QUESTIONNAIRE    Pain: chronic osteoarthritis of shoulders, knees and back- takes 100mg of Tramadol at bedtime    Nausea/Vomiting: no    Mouth Sores: recently resolved thrush- was treated with clotrimazole Dec 22nd    Shortness of Breath: yes, with exertion    Smoking: no    Fever or Chills: no    Hard Stools: yes, uses suppository when needed    Soft Stools: no    Weight Loss: no    Weakness: yes and fatigue    Burning, numbness or tingling in hands or feet: no    Problems with skin or swelling: dry itchy skin    Memory Loss: no    Anxiety or Depression: no    Trouble Sleeping: yes, insomnia    Patient also complains of being cold all the time. Has bouts of RLS and uses CPAP, although hasn't been using lately as he is often awake throughout the night.    Shelley Brice LPN on 1/12/2021 at 3:56 PM    History Of Present Illness:  Mr. Stephens is a 89 year old male who presents with anemia.  This is a video visit.  Patient's wife is also available and his daughter on the phone throughout the visit as well.  The daughter provides most of the history although patient and wife also provide history.    I reviewed notes from primary care provider Esperanza Lee.      For the last 2 years, he has been feeling cold and also feels itching.  He feels tired over the last 2 years. Light headedness/dizziness has also been going on off and on. Sometimes he gets low BP.  He has not been eating well over the last 6 months. Has lost 10 lbs over the last 6 months. No night sweats. He gets nervous frequently. He has issues with burping and hiccups for at least 1 year. Last fall he had issues with difficulty swallowing/something stuck and he took fish oil which helped. Now he can swallow OK. He has  some constipation for which he takes psyllium husk which helps.   No erythromelalgia. He has some leg edema and he wears support stockings which has helped.  Had recent thrush which was treated. No other infections,recently. Has dyspnea on exertion. He uses as needed albuterol. He has cough variant asthma.  No abnormal bleeding, melena, hematochezia. No pica symptoms. He has anxiety issues and has restless legs ( for 40 years ) for which he takes pramipexole.  On 10/6/2020 he was noted to have a hemoglobin of 9.7 with MCV 92.  14% monocytes were seen.      ROS:  A comprehensive ROS was otherwise neg    Past Medical/Surgical History:  No past medical history on file.  No past surgical history on file.   MICHAEL  Anemia  Osteoarthritis- on Tramadol  Restless legs.  Anxiety/Depression  Cough variant Asthma  BPH  Thoracic/Abdominal aortic aneurysm  Right atrial enlargement  Legally blind- macular degeneration  Rosacea  Basal Cell Cancer of the skin.      Allergies:  Allergies as of 01/12/2021 - Reviewed 01/12/2021   Allergen Reaction Noted     Ambien [zolpidem]  12/03/2016     Detrol [tolterodine]  12/03/2016     Erythromycin  12/03/2016     Gabapentin  12/03/2016     Keflex [cephalexin] Hives 08/15/2008     Klonopin [clonazepam]  12/03/2016     Oxybutynin  12/03/2016     Prednisone Other (See Comments) 06/13/2019     Terazosin  12/03/2016     Current Medications:  Current Outpatient Medications   Medication Sig Dispense Refill     albuterol (PROAIR HFA/PROVENTIL HFA/VENTOLIN HFA) 108 (90 BASE) MCG/ACT Inhaler Inhale 2 puffs into the lungs every 6 hours       alfuzosin (UROXATRAL) 10 MG 24 hr tablet Take 10 mg by mouth daily       benzonatate (TESSALON) 100 MG capsule Take 100 mg by mouth every 6 hours       Calcium Citrate-Vitamin D (CALCIUM CITRATE + D PO) Take by mouth 2 times daily       Cholecalciferol (VITAMIN D-3) 125 MCG (5000 UT) TABS Take by mouth daily       clotrimazole (MYCELEX) 10 MG lozenge Place 1 lozenge  (10 mg) inside cheek 5 times daily 70 each 0     desmopressin (DDAVP) 0.1 MG tablet Take 1 tablet (100 mcg) by mouth At Bedtime 10 tablet 11     diclofenac (VOLTAREN) 1 % GEL topical gel Apply ~ 4 grams to shoulders four times daily using enclosed dosing card. 100 g 1     escitalopram (LEXAPRO) 10 MG tablet Take 1 tablet (10 mg) by mouth daily 90 tablet 0     ferrous sulfate 220 (44 Fe) MG/5ML LIQD Take by mouth daily       finasteride (PROSCAR) 5 MG tablet Take 5 mg by mouth daily       fluticasone-salmeterol (ADVAIR DISKUS) 250-50 MCG/DOSE inhaler daily       lidocaine patch in PLACE as needed       LUTEIN-ZEAXANTHIN PO        MAGNESIUM GLYCINATE PLUS PO        methocarbamol (ROBAXIN) 500 MG tablet Take 500 mg by mouth At Bedtime       metroNIDAZOLE (METROGEL) 0.75 % topical gel Apply topically 2 times daily       Multiple Vitamins-Minerals (PRESERVISION AREDS) CAPS        Multiple Vitamins-Minerals (ZINC PO)        Omega-3 Fatty Acids (FISH OIL PO) Take by mouth daily       pramipexole (MIRAPEX) 0.125 MG tablet Take 0.25 mg by mouth daily (with dinner)       Psyllium (METAMUCIL PO)        saw palmetto 450 MG CAPS capsule Take 540 mg by mouth daily        traMADol (ULTRAM) 50 MG tablet Take 100 mg by mouth At Bedtime        vitamin B complex with vitamin C (VITAMIN  B COMPLEX) tablet Take 1 tablet by mouth daily Every other day        Family History:  Family History   Problem Relation Age of Onset     Coronary Artery Disease Mother      Hypertension Mother      No family history of bleeding or clotting disorder.    Social History:  Social History     Socioeconomic History     Marital status:      Spouse name: Not on file     Number of children: Not on file     Years of education: Not on file     Highest education level: Not on file   Occupational History     Not on file   Social Needs     Financial resource strain: Not on file     Food insecurity     Worry: Not on file     Inability: Not on file      "Transportation needs     Medical: Not on file     Non-medical: Not on file   Tobacco Use     Smoking status: Former Smoker     Smokeless tobacco: Never Used   Substance and Sexual Activity     Alcohol use: Not Currently     Alcohol/week: 0.0 standard drinks     Drug use: Never     Sexual activity: Not Currently     Partners: Female   Lifestyle     Physical activity     Days per week: Not on file     Minutes per session: Not on file     Stress: Not on file   Relationships     Social connections     Talks on phone: Not on file     Gets together: Not on file     Attends Buddhist service: Not on file     Active member of club or organization: Not on file     Attends meetings of clubs or organizations: Not on file     Relationship status: Not on file     Intimate partner violence     Fear of current or ex partner: Not on file     Emotionally abused: Not on file     Physically abused: Not on file     Forced sexual activity: Not on file   Other Topics Concern     Parent/sibling w/ CABG, MI or angioplasty before 65F 55M? Not Asked   Social History Narrative     Not on file     Used to smoke pipe 30 years ago. No etoh. Lives with wife.     Physical Exam:  Ht 1.753 m (5' 9\")   Wt 90.7 kg (200 lb)   BMI 29.53 kg/m     Wt Readings from Last 4 Encounters:   01/12/21 90.7 kg (200 lb)   02/28/19 90.3 kg (199 lb 1.6 oz)   11/03/17 88.9 kg (196 lb)   09/01/17 89.4 kg (197 lb)       Constitutional.  Does not seem to be in any acute distress.  Eyes.  No redness or discharge noted.  Respiratory.  Speaking in full sentences.  Breathing seems comfortable without any accessory use of muscles.    Skin.  Visualized his skin does not show any obvious rashes.  Musculoskeletal.  Range of motion for visualized areas is intact.  Neurological.  Alert and oriented x3.  Psychiatric.  Mood, mentation and affect are normal.  Decision making capacity is intact.      The rest of a comprehensive physical examination is deferred due to Public Health " Emergency video visit restrictions.    Laboratory/Imaging Studies      Reviewed  On 10/6/2020 he was noted to have a hemoglobin of 9.7 with MCV 92.  14% monocytes were seen.    In 2016 hemoglobin was 12.6.  In 2017 BMP shows normal kidney functions.      ASSESSMENT/PLAN:    Anemia.  He is symptomatic and mentions about extreme fatigue.  At this time the cause of anemia is unknown and we need to do work this up.  I would like to do further work-up and check peripheral blood smear, comprehensive metabolic panel.  We will do test to rule out iron deficiency, B12 and folate deficiency.  I would also like to check erythropoietin levels.  I would also like to do serum protein electrophoresis.    Depending on the results of the tests mentioned above, we will decide if any further work-up needs to be done or not.    He has several issues going on and at this time I am not certain how these are and to how much extent are they related to his hematological problem.  This needs to be determined.    I would like to see him back in about 4 weeks.    All of his and his family's questions were answered to their satisfaction.  They are agreeable and comfortable with the plan.    Bogdan Valladares MD      Video start time. 4:01 PM  Video stop time. 4:31 PM

## 2021-01-12 NOTE — LETTER
1/12/2021         RE: Eddie Stephens  [address]  [place] [place] [id number]        Dear Colleague,    Thank you for referring your patient, Eddie Stephens, to the [facility]. Please see a copy of my visit note below.    Hematology initial visit:  Date on this visit: 1/12/2021    Eddie Stephens  is referred by Dr.Jenna Luis F Lee for a hematology consultation. He requires evaluation for Anemia.    Primary Physician: Thom Cruz         SYMPTOM QUESTIONNAIRE    Pain: chronic osteoarthritis of shoulders, knees and back- takes 100mg of Tramadol at bedtime    Nausea/Vomiting: no    Mouth Sores: recently resolved thrush- was treated with clotrimazole Dec 22nd    Shortness of Breath: yes, with exertion    Smoking: no    Fever or Chills: no    Hard Stools: yes, uses suppository when needed    Soft Stools: no    Weight Loss: no    Weakness: yes and fatigue    Burning, numbness or tingling in hands or feet: no    Problems with skin or swelling: dry itchy skin    Memory Loss: no    Anxiety or Depression: no    Trouble Sleeping: yes, insomnia    Patient also complains of being cold all the time. Has bouts of RLS and uses CPAP, although hasn't been using lately as he is often awake throughout the night.    Shelley Brice LPN on 1/12/2021 at 3:56 PM    History Of Present Illness:  Mr. Stephens is a 89 year old male who presents with anemia.  This is a video visit.  Patient's wife is also available and his daughter on the phone throughout the visit as well.  The daughter provides most of the history although patient and wife also provide history.    I reviewed notes from primary care provider Esperanza Lee.      For the last 2 years, he has been feeling cold and also feels itching.  He feels tired over the last 2 years. Light headedness/dizziness has also been going on off and on. Sometimes he gets low BP.  He has not been eating well over the last 6 months. Has lost  10 lbs over the last 6 months. No night sweats. He gets nervous frequently. He has issues with burping and hiccups for at least 1 year. Last fall he had issues with difficulty swallowing/something stuck and he took fish oil which helped. Now he can swallow OK. He has some constipation for which he takes psyllium husk which helps.   No erythromelalgia. He has some leg edema and he wears support stockings which has helped.  Had recent thrush which was treated. No other infections,recently. Has dyspnea on exertion. He uses as needed albuterol. He has cough variant asthma.  No abnormal bleeding, melena, hematochezia. No pica symptoms. He has anxiety issues and has restless legs ( for 40 years ) for which he takes pramipexole.  On 10/6/2020 he was noted to have a hemoglobin of 9.7 with MCV 92.  14% monocytes were seen.      ROS:  A comprehensive ROS was otherwise neg    Past Medical/Surgical History:  No past medical history on file.  No past surgical history on file.   MICHAEL  Anemia  Osteoarthritis- on Tramadol  Restless legs.  Anxiety/Depression  Cough variant Asthma  BPH  Thoracic/Abdominal aortic aneurysm  Right atrial enlargement  Legally blind- macular degeneration  Rosacea  Basal Cell Cancer of the skin.      Allergies:  Allergies as of 01/12/2021 - Reviewed 01/12/2021   Allergen Reaction Noted     Ambien [zolpidem]  12/03/2016     Detrol [tolterodine]  12/03/2016     Erythromycin  12/03/2016     Gabapentin  12/03/2016     Keflex [cephalexin] Hives 08/15/2008     Klonopin [clonazepam]  12/03/2016     Oxybutynin  12/03/2016     Prednisone Other (See Comments) 06/13/2019     Terazosin  12/03/2016     Current Medications:  Current Outpatient Medications   Medication Sig Dispense Refill     albuterol (PROAIR HFA/PROVENTIL HFA/VENTOLIN HFA) 108 (90 BASE) MCG/ACT Inhaler Inhale 2 puffs into the lungs every 6 hours       alfuzosin (UROXATRAL) 10 MG 24 hr tablet Take 10 mg by mouth daily       benzonatate (TESSALON) 100 MG  capsule Take 100 mg by mouth every 6 hours       Calcium Citrate-Vitamin D (CALCIUM CITRATE + D PO) Take by mouth 2 times daily       Cholecalciferol (VITAMIN D-3) 125 MCG (5000 UT) TABS Take by mouth daily       clotrimazole (MYCELEX) 10 MG lozenge Place 1 lozenge (10 mg) inside cheek 5 times daily 70 each 0     desmopressin (DDAVP) 0.1 MG tablet Take 1 tablet (100 mcg) by mouth At Bedtime 10 tablet 11     diclofenac (VOLTAREN) 1 % GEL topical gel Apply ~ 4 grams to shoulders four times daily using enclosed dosing card. 100 g 1     escitalopram (LEXAPRO) 10 MG tablet Take 1 tablet (10 mg) by mouth daily 90 tablet 0     ferrous sulfate 220 (44 Fe) MG/5ML LIQD Take by mouth daily       finasteride (PROSCAR) 5 MG tablet Take 5 mg by mouth daily       fluticasone-salmeterol (ADVAIR DISKUS) 250-50 MCG/DOSE inhaler daily       lidocaine patch in PLACE as needed       LUTEIN-ZEAXANTHIN PO        MAGNESIUM GLYCINATE PLUS PO        methocarbamol (ROBAXIN) 500 MG tablet Take 500 mg by mouth At Bedtime       metroNIDAZOLE (METROGEL) 0.75 % topical gel Apply topically 2 times daily       Multiple Vitamins-Minerals (PRESERVISION AREDS) CAPS        Multiple Vitamins-Minerals (ZINC PO)        Omega-3 Fatty Acids (FISH OIL PO) Take by mouth daily       pramipexole (MIRAPEX) 0.125 MG tablet Take 0.25 mg by mouth daily (with dinner)       Psyllium (METAMUCIL PO)        saw palmetto 450 MG CAPS capsule Take 540 mg by mouth daily        traMADol (ULTRAM) 50 MG tablet Take 100 mg by mouth At Bedtime        vitamin B complex with vitamin C (VITAMIN  B COMPLEX) tablet Take 1 tablet by mouth daily Every other day        Family History:  Family History   Problem Relation Age of Onset     Coronary Artery Disease Mother      Hypertension Mother      No family history of bleeding or clotting disorder.    Social History:  Social History     Socioeconomic History     Marital status:      Spouse name: Not on file     Number of children:  "Not on file     Years of education: Not on file     Highest education level: Not on file   Occupational History     Not on file   Social Needs     Financial resource strain: Not on file     Food insecurity     Worry: Not on file     Inability: Not on file     Transportation needs     Medical: Not on file     Non-medical: Not on file   Tobacco Use     Smoking status: Former Smoker     Smokeless tobacco: Never Used   Substance and Sexual Activity     Alcohol use: Not Currently     Alcohol/week: 0.0 standard drinks     Drug use: Never     Sexual activity: Not Currently     Partners: Female   Lifestyle     Physical activity     Days per week: Not on file     Minutes per session: Not on file     Stress: Not on file   Relationships     Social connections     Talks on phone: Not on file     Gets together: Not on file     Attends Shinto service: Not on file     Active member of club or organization: Not on file     Attends meetings of clubs or organizations: Not on file     Relationship status: Not on file     Intimate partner violence     Fear of current or ex partner: Not on file     Emotionally abused: Not on file     Physically abused: Not on file     Forced sexual activity: Not on file   Other Topics Concern     Parent/sibling w/ CABG, MI or angioplasty before 65F 55M? Not Asked   Social History Narrative     Not on file     Used to smoke pipe 30 years ago. No etoh. Lives with wife.     Physical Exam:  Ht 1.753 m (5' 9\")   Wt 90.7 kg (200 lb)   BMI 29.53 kg/m     Wt Readings from Last 4 Encounters:   01/12/21 90.7 kg (200 lb)   02/28/19 90.3 kg (199 lb 1.6 oz)   11/03/17 88.9 kg (196 lb)   09/01/17 89.4 kg (197 lb)       Constitutional.  Does not seem to be in any acute distress.  Eyes.  No redness or discharge noted.  Respiratory.  Speaking in full sentences.  Breathing seems comfortable without any accessory use of muscles.    Skin.  Visualized his skin does not show any obvious rashes.  Musculoskeletal.  Range " of motion for visualized areas is intact.  Neurological.  Alert and oriented x3.  Psychiatric.  Mood, mentation and affect are normal.  Decision making capacity is intact.      The rest of a comprehensive physical examination is deferred due to Public Health Emergency video visit restrictions.    Laboratory/Imaging Studies      Reviewed  On 10/6/2020 he was noted to have a hemoglobin of 9.7 with MCV 92.  14% monocytes were seen.    In 2016 hemoglobin was 12.6.  In 2017 BMP shows normal kidney functions.      ASSESSMENT/PLAN:    Anemia.  He is symptomatic and mentions about extreme fatigue.  At this time the cause of anemia is unknown and we need to do work this up.  I would like to do further work-up and check peripheral blood smear, comprehensive metabolic panel.  We will do test to rule out iron deficiency, B12 and folate deficiency.  I would also like to check erythropoietin levels.  I would also like to do serum protein electrophoresis.    Depending on the results of the tests mentioned above, we will decide if any further work-up needs to be done or not.    He has several issues going on and at this time I am not certain how these are and to how much extent are they related to his hematological problem.  This needs to be determined.    I would like to see him back in about 4 weeks.    All of his and his family's questions were answered to their satisfaction.  They are agreeable and comfortable with the plan.    Bogdan Valladares MD      Video start time. 4:01 PM  Video stop time. 4:31 PM        Again, thank you for allowing me to participate in the care of your patient.        Sincerely,        Bogdan Valladares MD

## 2021-01-19 DIAGNOSIS — D64.9 ANEMIA, UNSPECIFIED TYPE: ICD-10-CM

## 2021-01-19 LAB
COPATH REPORT: NORMAL
DIFFERENTIAL METHOD BLD: ABNORMAL
EOSINOPHIL # BLD AUTO: 0.1 10E9/L (ref 0–0.7)
EOSINOPHIL NFR BLD AUTO: 1 %
ERYTHROCYTE [DISTWIDTH] IN BLOOD BY AUTOMATED COUNT: 14.7 % (ref 10–15)
FOLATE SERPL-MCNC: 12.3 NG/ML
HCT VFR BLD AUTO: 31.1 % (ref 40–53)
HGB BLD-MCNC: 10.3 G/DL (ref 13.3–17.7)
LYMPHOCYTES # BLD AUTO: 1.2 10E9/L (ref 0.8–5.3)
LYMPHOCYTES NFR BLD AUTO: 20 %
MCH RBC QN AUTO: 30.7 PG (ref 26.5–33)
MCHC RBC AUTO-ENTMCNC: 33.1 G/DL (ref 31.5–36.5)
MCV RBC AUTO: 93 FL (ref 78–100)
MONOCYTES # BLD AUTO: 0.4 10E9/L (ref 0–1.3)
MONOCYTES NFR BLD AUTO: 6 %
NEUTROPHILS # BLD AUTO: 4.2 10E9/L (ref 1.6–8.3)
NEUTROPHILS NFR BLD AUTO: 73 %
PLATELET # BLD AUTO: 156 10E9/L (ref 150–450)
PLATELET # BLD EST: ABNORMAL 10*3/UL
RBC # BLD AUTO: 3.35 10E12/L (ref 4.4–5.9)
RBC MORPH BLD: ABNORMAL
RETICS # AUTO: 57.9 10E9/L (ref 25–95)
RETICS/RBC NFR AUTO: 1.7 % (ref 0.5–2)
VIT B12 SERPL-MCNC: 471 PG/ML (ref 193–986)
WBC # BLD AUTO: 5.9 10E9/L (ref 4–11)

## 2021-01-19 PROCEDURE — 99N1109 PR STATISTIC MORPHOLOGY W/INTERP HISTOLOGY TC 85060: Performed by: PATHOLOGY

## 2021-01-19 PROCEDURE — 82668 ASSAY OF ERYTHROPOIETIN: CPT | Mod: 90 | Performed by: INTERNAL MEDICINE

## 2021-01-19 PROCEDURE — 84165 PROTEIN E-PHORESIS SERUM: CPT | Performed by: PATHOLOGY

## 2021-01-19 PROCEDURE — 99000 SPECIMEN HANDLING OFFICE-LAB: CPT | Performed by: INTERNAL MEDICINE

## 2021-01-19 PROCEDURE — 85045 AUTOMATED RETICULOCYTE COUNT: CPT | Performed by: INTERNAL MEDICINE

## 2021-01-19 PROCEDURE — 82746 ASSAY OF FOLIC ACID SERUM: CPT | Performed by: INTERNAL MEDICINE

## 2021-01-19 PROCEDURE — 85025 COMPLETE CBC W/AUTO DIFF WBC: CPT | Performed by: INTERNAL MEDICINE

## 2021-01-19 PROCEDURE — 83540 ASSAY OF IRON: CPT | Performed by: INTERNAL MEDICINE

## 2021-01-19 PROCEDURE — 36415 COLL VENOUS BLD VENIPUNCTURE: CPT | Performed by: INTERNAL MEDICINE

## 2021-01-19 PROCEDURE — 80053 COMPREHEN METABOLIC PANEL: CPT | Performed by: INTERNAL MEDICINE

## 2021-01-19 PROCEDURE — 82728 ASSAY OF FERRITIN: CPT | Performed by: INTERNAL MEDICINE

## 2021-01-19 PROCEDURE — 99N1036 PR STATISTIC TOTAL PROTEIN: Performed by: INTERNAL MEDICINE

## 2021-01-19 PROCEDURE — 83550 IRON BINDING TEST: CPT | Performed by: INTERNAL MEDICINE

## 2021-01-19 PROCEDURE — 82607 VITAMIN B-12: CPT | Performed by: INTERNAL MEDICINE

## 2021-01-20 LAB
ALBUMIN SERPL ELPH-MCNC: 3.8 G/DL (ref 3.7–5.1)
ALBUMIN SERPL-MCNC: 3.4 G/DL (ref 3.4–5)
ALP SERPL-CCNC: 149 U/L (ref 40–150)
ALPHA1 GLOB SERPL ELPH-MCNC: 0.4 G/DL (ref 0.2–0.4)
ALPHA2 GLOB SERPL ELPH-MCNC: 0.7 G/DL (ref 0.5–0.9)
ALT SERPL W P-5'-P-CCNC: 20 U/L (ref 0–70)
ANION GAP SERPL CALCULATED.3IONS-SCNC: 7 MMOL/L (ref 3–14)
AST SERPL W P-5'-P-CCNC: 19 U/L (ref 0–45)
B-GLOBULIN SERPL ELPH-MCNC: 0.9 G/DL (ref 0.6–1)
BILIRUB SERPL-MCNC: 0.3 MG/DL (ref 0.2–1.3)
BUN SERPL-MCNC: 36 MG/DL (ref 7–30)
CALCIUM SERPL-MCNC: 8.7 MG/DL (ref 8.5–10.1)
CHLORIDE SERPL-SCNC: 103 MMOL/L (ref 94–109)
CO2 SERPL-SCNC: 24 MMOL/L (ref 20–32)
CREAT SERPL-MCNC: 1.11 MG/DL (ref 0.66–1.25)
EPO SERPL-ACNC: 18 MU/ML (ref 4–27)
FERRITIN SERPL-MCNC: 112 NG/ML (ref 26–388)
GAMMA GLOB SERPL ELPH-MCNC: 1 G/DL (ref 0.7–1.6)
GFR SERPL CREATININE-BSD FRML MDRD: 55 ML/MIN/{1.73_M2}
GLUCOSE SERPL-MCNC: 111 MG/DL (ref 70–99)
IRON SATN MFR SERPL: 15 % (ref 15–46)
IRON SERPL-MCNC: 34 UG/DL (ref 35–180)
M PROTEIN SERPL ELPH-MCNC: 0 G/DL
POTASSIUM SERPL-SCNC: 5 MMOL/L (ref 3.4–5.3)
PROT PATTERN SERPL ELPH-IMP: NORMAL
PROT SERPL-MCNC: 7.2 G/DL (ref 6.8–8.8)
SODIUM SERPL-SCNC: 134 MMOL/L (ref 133–144)
TIBC SERPL-MCNC: 234 UG/DL (ref 240–430)

## 2021-01-26 ENCOUNTER — VIRTUAL VISIT (OUTPATIENT)
Dept: ONCOLOGY | Facility: CLINIC | Age: 86
End: 2021-01-26
Attending: INTERNAL MEDICINE
Payer: MEDICARE

## 2021-01-26 DIAGNOSIS — D64.9 ANEMIA, UNSPECIFIED TYPE: Primary | ICD-10-CM

## 2021-01-26 PROCEDURE — 99215 OFFICE O/P EST HI 40 MIN: CPT | Mod: 95 | Performed by: INTERNAL MEDICINE

## 2021-01-26 NOTE — NURSING NOTE
Tyshawn is a 89 year old who is being evaluated via a billable video visit.      How would you like to obtain your AVS? MyChart  If the video visit is dropped, the invitation should be resent by: Text to cell phone: [phone]  Will anyone else be joining your video visit? Yes, patient's wife and daughter, Susan will be present during video visit.      Video-Visit Details    Type of service:  Video Visit    Originating Location (pt. Location): Home    Distant Location (provider location):  [facility]     Platform used for Video Visit: [facility]    SYMPTOM QUESTIONNAIRE    Pain: no    Nausea/Vomiting: no    Mouth Sores: no    Shortness of Breath: no    Smoking: no    Fever or Chills: no    Hard Stools: yes, ducolax and senna    Soft Stools: no    Weight Loss: no    Weakness: no    Burning, numbness or tingling in hands or feet: no    Problems with skin or swelling: no    Memory Loss: no    Anxiety or Depression: no    Trouble Sleeping: no    Shelley Brice LPN on 1/26/2021 at 3:09 PM

## 2021-01-26 NOTE — LETTER
1/26/2021         RE: Eddie Stephens  [address]  [facility] [id number]        Dear Colleague,    Thank you for referring your patient, Eddie Stephens, to the [facility]. Please see a copy of my visit note below.    Hematology follow up visit:  Date on this visit: 1/26/2021     Eddie Stephens  is referred by Dr.Jenna Luis F Lee for a hematology consultation. He requires evaluation for Anemia.    Primary Physician: Thom Cruz         History Of Present Illness:  Please see my previous note for details.  I have copied and updated from prior note.    He initially saw me on 1/12/2021 for evaluation regarding anemia.    Mr. Stephens is a 89 year old male who presents with anemia.    I reviewed notes from primary care provider Esperanza Lee.      For the last 2 years, he has been feeling cold and also feels itching.  He feels tired over the last 2 years. Light headedness/dizziness has also been going on off and on. Sometimes he gets low BP.  He has not been eating well over the last 6 months. Has lost 10 lbs over the last 6 months. No night sweats. He gets nervous frequently. He has issues with burping and hiccups for at least 1 year. Last fall he had issues with difficulty swallowing/something stuck and he took fish oil which helped. Now he can swallow OK. He has some constipation for which he takes psyllium husk which helps.   No erythromelalgia. He has some leg edema and he wears support stockings which has helped.  Had recent thrush which was treated. No other infections,recently. Has dyspnea on exertion. He uses as needed albuterol. He has cough variant asthma.  No abnormal bleeding, melena, hematochezia. No pica symptoms. He has anxiety issues and has restless legs ( for 40 years ) for which he takes pramipexole.  On 10/6/2020 he was noted to have a hemoglobin of 9.7 with MCV 92.  14% monocytes were seen.    I did further work-up for  anemia.  Peripheral blood smear showed normocytic normochromic anemia.  Hemoglobin was 10.3.  He was noted to have a ferritin of 112, iron 34, TIBC 234 and iron saturation index 15%.  Folate and B12 were normal.  Erythropoietin was inappropriately low at 18.  Serum creatinine was 1.11.   Serum protein electrophoresis was unremarkable.      Interval history.    This is a video visit.  Patient's daughter is also available throughout the visit as well.  The daughter provides most of the history although patient also provides history.    Overall he feels about the same and denies any new complaints.  He recently increased the iron supplements to 3 times a day.  He is tolerating it well.      ROS:  Rest of the review of the system was unremarkable.    I reviewed other history in epic as below.    Past Medical/Surgical History:  No past medical history on file.  No past surgical history on file.   MICHAEL  Anemia  Osteoarthritis- on Tramadol  Restless legs.  Anxiety/Depression  Cough variant Asthma  BPH  Thoracic/Abdominal aortic aneurysm  Right atrial enlargement  Legally blind- macular degeneration  Rosacea  Basal Cell Cancer of the skin.      Allergies:  Allergies as of 01/26/2021 - Reviewed 01/12/2021   Allergen Reaction Noted     Ambien [zolpidem]  12/03/2016     Detrol [tolterodine]  12/03/2016     Erythromycin  12/03/2016     Gabapentin  12/03/2016     Keflex [cephalexin] Hives 08/15/2008     Klonopin [clonazepam]  12/03/2016     Oxybutynin  12/03/2016     Prednisone Other (See Comments) 06/13/2019     Terazosin  12/03/2016     Current Medications:  Current Outpatient Medications   Medication Sig Dispense Refill     albuterol (PROAIR HFA/PROVENTIL HFA/VENTOLIN HFA) 108 (90 BASE) MCG/ACT Inhaler Inhale 2 puffs into the lungs every 6 hours       alfuzosin (UROXATRAL) 10 MG 24 hr tablet Take 10 mg by mouth daily       benzonatate (TESSALON) 100 MG capsule Take 100 mg by mouth every 6 hours       Calcium Citrate-Vitamin D  (CALCIUM CITRATE + D PO) Take by mouth 2 times daily       Cholecalciferol (VITAMIN D-3) 125 MCG (5000 UT) TABS Take by mouth daily       clotrimazole (MYCELEX) 10 MG lozenge Place 1 lozenge (10 mg) inside cheek 5 times daily 70 each 0     desmopressin (DDAVP) 0.1 MG tablet Take 1 tablet (100 mcg) by mouth At Bedtime 10 tablet 11     diclofenac (VOLTAREN) 1 % GEL topical gel Apply ~ 4 grams to shoulders four times daily using enclosed dosing card. 100 g 1     escitalopram (LEXAPRO) 10 MG tablet Take 1 tablet (10 mg) by mouth daily 90 tablet 0     ferrous sulfate 220 (44 Fe) MG/5ML LIQD Take by mouth daily       finasteride (PROSCAR) 5 MG tablet Take 5 mg by mouth daily       fluticasone-salmeterol (ADVAIR DISKUS) 250-50 MCG/DOSE inhaler daily       lidocaine patch in PLACE as needed       LUTEIN-ZEAXANTHIN PO        MAGNESIUM GLYCINATE PLUS PO        methocarbamol (ROBAXIN) 500 MG tablet Take 500 mg by mouth At Bedtime       metroNIDAZOLE (METROGEL) 0.75 % topical gel Apply topically 2 times daily       Multiple Vitamins-Minerals (PRESERVISION AREDS) CAPS        Multiple Vitamins-Minerals (ZINC PO)        Omega-3 Fatty Acids (FISH OIL PO) Take by mouth daily       pramipexole (MIRAPEX) 0.125 MG tablet Take 0.25 mg by mouth daily (with dinner)       Psyllium (METAMUCIL PO)        saw palmetto 450 MG CAPS capsule Take 540 mg by mouth daily        traMADol (ULTRAM) 50 MG tablet Take 100 mg by mouth At Bedtime        vitamin B complex with vitamin C (VITAMIN  B COMPLEX) tablet Take 1 tablet by mouth daily Every other day        Family History:  Family History   Problem Relation Age of Onset     Coronary Artery Disease Mother      Hypertension Mother      No family history of bleeding or clotting disorder.    Social History:  Social History     Socioeconomic History     Marital status:      Spouse name: Not on file     Number of children: Not on file     Years of education: Not on file     Highest education level:  Not on file   Occupational History     Not on file   Social Needs     Financial resource strain: Not on file     Food insecurity     Worry: Not on file     Inability: Not on file     Transportation needs     Medical: Not on file     Non-medical: Not on file   Tobacco Use     Smoking status: Former Smoker     Smokeless tobacco: Never Used   Substance and Sexual Activity     Alcohol use: Not Currently     Alcohol/week: 0.0 standard drinks     Drug use: Never     Sexual activity: Not Currently     Partners: Female   Lifestyle     Physical activity     Days per week: Not on file     Minutes per session: Not on file     Stress: Not on file   Relationships     Social connections     Talks on phone: Not on file     Gets together: Not on file     Attends [employer] service: Not on file     Active member of club or organization: Not on file     Attends meetings of clubs or organizations: Not on file     Relationship status: Not on file     Intimate partner violence     Fear of current or ex partner: Not on file     Emotionally abused: Not on file     Physically abused: Not on file     Forced sexual activity: Not on file   Other Topics Concern     Parent/sibling w/ CABG, MI or angioplasty before 65F 55M? Not Asked   Social History Narrative     Not on file     Used to smoke pipe 30 years ago. No etoh. Lives with wife.     Physical Exam:  There were no vitals taken for this visit.   Wt Readings from Last 4 Encounters:   01/12/21 90.7 kg (200 lb)   02/28/19 90.3 kg (199 lb 1.6 oz)   11/03/17 88.9 kg (196 lb)   09/01/17 89.4 kg (197 lb)       Constitutional.  Looks well and in no apparent distress.   Eyes.  Without eye redness or apparent jaundice.   Respiratory.  Non labored breathing. Speaking in full sentences.    Skin.  No concerning skin rashes on the skin visualized.   Neurological.  Is alert and oriented.  Psychiatric.  Mood and affect seem appropriate.      The rest of a comprehensive physical examination is deferred due  to [facility] Emergency video visit restrictions.      Laboratory/Imaging Studies      Reviewed    Peripheral blood smear showed normocytic normochromic anemia.  Hemoglobin was 10.3.  Iron studies showed ferritin of 112, iron 34, TIBC 234 and iron saturation index 15%.    Folate and B12 were normal.    Erythropoietin was inappropriately low at 18.  BMP showed serum creatinine was 1.11.   Serum protein electrophoresis was unremarkable.      ASSESSMENT/PLAN:    Anemia.   The work-up shows that he has normochromic normocytic anemia which likely is a combination of anemia of chronic disease and relative erythropoietin deficiency.  I recommend that we keep his iron stores under very good levels and try to keep iron saturations close to 30%.    He is taking oral iron with Vit C and I recommend he continues to take the increased dose and we repeat labs in 2 months. We can consider IV iron if he does not improve.     He will follow with PCP for other chronic issues.     RTC in 2 months.     All of his and his family's questions were answered to their satisfaction.  They are agreeable and comfortable with the plan.    Bogdan Valladares MD      Video start time. 3:20 PM  Video stop time. 3:36 PM    I spent 45 minutes on this visit including the video time, reviewing records and labs and placing new orders as well as documentation.        Again, thank you for allowing me to participate in the care of your patient.        Sincerely,        Bogdan Valladares MD

## 2021-01-26 NOTE — PROGRESS NOTES
Hematology follow up visit:  Date on this visit: 1/26/2021     Eddie Stephens  is referred by Dr.Jenna Luis F Lee for a hematology consultation. He requires evaluation for Anemia.    Primary Physician: Thom Cruz         History Of Present Illness:  Please see my previous note for details.  I have copied and updated from prior note.    He initially saw me on 1/12/2021 for evaluation regarding anemia.    Mr. Stephens is a 89 year old male who presents with anemia.    I reviewed notes from primary care provider Esperanza Lee.      For the last 2 years, he has been feeling cold and also feels itching.  He feels tired over the last 2 years. Light headedness/dizziness has also been going on off and on. Sometimes he gets low BP.  He has not been eating well over the last 6 months. Has lost 10 lbs over the last 6 months. No night sweats. He gets nervous frequently. He has issues with burping and hiccups for at least 1 year. Last fall he had issues with difficulty swallowing/something stuck and he took fish oil which helped. Now he can swallow OK. He has some constipation for which he takes psyllium husk which helps.   No erythromelalgia. He has some leg edema and he wears support stockings which has helped.  Had recent thrush which was treated. No other infections,recently. Has dyspnea on exertion. He uses as needed albuterol. He has cough variant asthma.  No abnormal bleeding, melena, hematochezia. No pica symptoms. He has anxiety issues and has restless legs ( for 40 years ) for which he takes pramipexole.  On 10/6/2020 he was noted to have a hemoglobin of 9.7 with MCV 92.  14% monocytes were seen.    I did further work-up for anemia.  Peripheral blood smear showed normocytic normochromic anemia.  Hemoglobin was 10.3.  He was noted to have a ferritin of 112, iron 34, TIBC 234 and iron saturation index 15%.  Folate and B12 were normal.  Erythropoietin was inappropriately low at 18.  Serum creatinine was  1.11.   Serum protein electrophoresis was unremarkable.      Interval history.    This is a video visit.  Patient's daughter is also available throughout the visit as well.  The daughter provides most of the history although patient also provides history.    Overall he feels about the same and denies any new complaints.  He recently increased the iron supplements to 3 times a day.  He is tolerating it well.      ROS:  Rest of the review of the system was unremarkable.    I reviewed other history in epic as below.    Past Medical/Surgical History:  No past medical history on file.  No past surgical history on file.   MICHAEL  Anemia  Osteoarthritis- on Tramadol  Restless legs.  Anxiety/Depression  Cough variant Asthma  BPH  Thoracic/Abdominal aortic aneurysm  Right atrial enlargement  Legally blind- macular degeneration  Rosacea  Basal Cell Cancer of the skin.      Allergies:  Allergies as of 01/26/2021 - Reviewed 01/12/2021   Allergen Reaction Noted     Ambien [zolpidem]  12/03/2016     Detrol [tolterodine]  12/03/2016     Erythromycin  12/03/2016     Gabapentin  12/03/2016     Keflex [cephalexin] Hives 08/15/2008     Klonopin [clonazepam]  12/03/2016     Oxybutynin  12/03/2016     Prednisone Other (See Comments) 06/13/2019     Terazosin  12/03/2016     Current Medications:  Current Outpatient Medications   Medication Sig Dispense Refill     albuterol (PROAIR HFA/PROVENTIL HFA/VENTOLIN HFA) 108 (90 BASE) MCG/ACT Inhaler Inhale 2 puffs into the lungs every 6 hours       alfuzosin (UROXATRAL) 10 MG 24 hr tablet Take 10 mg by mouth daily       benzonatate (TESSALON) 100 MG capsule Take 100 mg by mouth every 6 hours       Calcium Citrate-Vitamin D (CALCIUM CITRATE + D PO) Take by mouth 2 times daily       Cholecalciferol (VITAMIN D-3) 125 MCG (5000 UT) TABS Take by mouth daily       clotrimazole (MYCELEX) 10 MG lozenge Place 1 lozenge (10 mg) inside cheek 5 times daily 70 each 0     desmopressin (DDAVP) 0.1 MG tablet Take 1  tablet (100 mcg) by mouth At Bedtime 10 tablet 11     diclofenac (VOLTAREN) 1 % GEL topical gel Apply ~ 4 grams to shoulders four times daily using enclosed dosing card. 100 g 1     escitalopram (LEXAPRO) 10 MG tablet Take 1 tablet (10 mg) by mouth daily 90 tablet 0     ferrous sulfate 220 (44 Fe) MG/5ML LIQD Take by mouth daily       finasteride (PROSCAR) 5 MG tablet Take 5 mg by mouth daily       fluticasone-salmeterol (ADVAIR DISKUS) 250-50 MCG/DOSE inhaler daily       lidocaine patch in PLACE as needed       LUTEIN-ZEAXANTHIN PO        MAGNESIUM GLYCINATE PLUS PO        methocarbamol (ROBAXIN) 500 MG tablet Take 500 mg by mouth At Bedtime       metroNIDAZOLE (METROGEL) 0.75 % topical gel Apply topically 2 times daily       Multiple Vitamins-Minerals (PRESERVISION AREDS) CAPS        Multiple Vitamins-Minerals (ZINC PO)        Omega-3 Fatty Acids (FISH OIL PO) Take by mouth daily       pramipexole (MIRAPEX) 0.125 MG tablet Take 0.25 mg by mouth daily (with dinner)       Psyllium (METAMUCIL PO)        saw palmetto 450 MG CAPS capsule Take 540 mg by mouth daily        traMADol (ULTRAM) 50 MG tablet Take 100 mg by mouth At Bedtime        vitamin B complex with vitamin C (VITAMIN  B COMPLEX) tablet Take 1 tablet by mouth daily Every other day        Family History:  Family History   Problem Relation Age of Onset     Coronary Artery Disease Mother      Hypertension Mother      No family history of bleeding or clotting disorder.    Social History:  Social History     Socioeconomic History     Marital status:      Spouse name: Not on file     Number of children: Not on file     Years of education: Not on file     Highest education level: Not on file   Occupational History     Not on file   Social Needs     Financial resource strain: Not on file     Food insecurity     Worry: Not on file     Inability: Not on file     Transportation needs     Medical: Not on file     Non-medical: Not on file   Tobacco Use      Smoking status: Former Smoker     Smokeless tobacco: Never Used   Substance and Sexual Activity     Alcohol use: Not Currently     Alcohol/week: 0.0 standard drinks     Drug use: Never     Sexual activity: Not Currently     Partners: Female   Lifestyle     Physical activity     Days per week: Not on file     Minutes per session: Not on file     Stress: Not on file   Relationships     Social connections     Talks on phone: Not on file     Gets together: Not on file     Attends Ca[employer] service: Not on file     Active member of club or organization: Not on file     Attends meetings of clubs or organizations: Not on file     Relationship status: Not on file     Intimate partner violence     Fear of current or ex partner: Not on file     Emotionally abused: Not on file     Physically abused: Not on file     Forced sexual activity: Not on file   Other Topics Concern     Parent/sibling w/ CABG, MI or angioplasty before 65F 55M? Not Asked   Social History Narrative     Not on file     Used to smoke pipe 30 years ago. No etoh. Lives with wife.     Physical Exam:  There were no vitals taken for this visit.   Wt Readings from Last 4 Encounters:   01/12/21 90.7 kg (200 lb)   02/28/19 90.3 kg (199 lb 1.6 oz)   11/03/17 88.9 kg (196 lb)   09/01/17 89.4 kg (197 lb)       Constitutional.  Looks well and in no apparent distress.   Eyes.  Without eye redness or apparent jaundice.   Respiratory.  Non labored breathing. Speaking in full sentences.    Skin.  No concerning skin rashes on the skin visualized.   Neurological.  Is alert and oriented.  Psychiatric.  Mood and affect seem appropriate.      The rest of a comprehensive physical examination is deferred due to [facility] Emergency video visit restrictions.      Laboratory/Imaging Studies      Reviewed    Peripheral blood smear showed normocytic normochromic anemia.  Hemoglobin was 10.3.  Iron studies showed ferritin of 112, iron 34, TIBC 234 and iron saturation index 15%.     Folate and B12 were normal.    Erythropoietin was inappropriately low at 18.  BMP showed serum creatinine was 1.11.   Serum protein electrophoresis was unremarkable.      ASSESSMENT/PLAN:    Anemia.   The work-up shows that he has normochromic normocytic anemia which likely is a combination of anemia of chronic disease and relative erythropoietin deficiency.  I recommend that we keep his iron stores under very good levels and try to keep iron saturations close to 30%.    He is taking oral iron with Vit C and I recommend he continues to take the increased dose and we repeat labs in 2 months. We can consider IV iron if he does not improve.     He will follow with PCP for other chronic issues.     RTC in 2 months.     All of his and his family's questions were answered to their satisfaction.  They are agreeable and comfortable with the plan.    Bogdan Valladares MD      Video start time. 3:20 PM  Video stop time. 3:36 PM    I spent 45 minutes on this visit including the video time, reviewing records and labs and placing new orders as well as documentation.

## 2021-04-10 DIAGNOSIS — D64.9 ANEMIA, UNSPECIFIED TYPE: ICD-10-CM

## 2021-04-10 LAB
BASOPHILS # BLD AUTO: 0 10E9/L (ref 0–0.2)
BASOPHILS NFR BLD AUTO: 0.2 %
DIFFERENTIAL METHOD BLD: ABNORMAL
EOSINOPHIL # BLD AUTO: 0.1 10E9/L (ref 0–0.7)
EOSINOPHIL NFR BLD AUTO: 1.3 %
ERYTHROCYTE [DISTWIDTH] IN BLOOD BY AUTOMATED COUNT: 15 % (ref 10–15)
FERRITIN SERPL-MCNC: 134 NG/ML (ref 26–388)
HCT VFR BLD AUTO: 32.8 % (ref 40–53)
HGB BLD-MCNC: 10.7 G/DL (ref 13.3–17.7)
IRON SATN MFR SERPL: 21 % (ref 15–46)
IRON SERPL-MCNC: 41 UG/DL (ref 35–180)
LYMPHOCYTES # BLD AUTO: 1.8 10E9/L (ref 0.8–5.3)
LYMPHOCYTES NFR BLD AUTO: 28.7 %
MCH RBC QN AUTO: 30.3 PG (ref 26.5–33)
MCHC RBC AUTO-ENTMCNC: 32.6 G/DL (ref 31.5–36.5)
MCV RBC AUTO: 93 FL (ref 78–100)
MONOCYTES # BLD AUTO: 0.7 10E9/L (ref 0–1.3)
MONOCYTES NFR BLD AUTO: 12 %
NEUTROPHILS # BLD AUTO: 3.5 10E9/L (ref 1.6–8.3)
NEUTROPHILS NFR BLD AUTO: 57.8 %
PLATELET # BLD AUTO: 183 10E9/L (ref 150–450)
RBC # BLD AUTO: 3.53 10E12/L (ref 4.4–5.9)
TIBC SERPL-MCNC: 191 UG/DL (ref 240–430)
WBC # BLD AUTO: 6.1 10E9/L (ref 4–11)

## 2021-04-10 PROCEDURE — 36415 COLL VENOUS BLD VENIPUNCTURE: CPT | Performed by: INTERNAL MEDICINE

## 2021-04-10 PROCEDURE — 83550 IRON BINDING TEST: CPT | Performed by: INTERNAL MEDICINE

## 2021-04-10 PROCEDURE — 85045 AUTOMATED RETICULOCYTE COUNT: CPT | Performed by: INTERNAL MEDICINE

## 2021-04-10 PROCEDURE — 82728 ASSAY OF FERRITIN: CPT | Performed by: INTERNAL MEDICINE

## 2021-04-10 PROCEDURE — 83540 ASSAY OF IRON: CPT | Performed by: INTERNAL MEDICINE

## 2021-04-10 PROCEDURE — 85025 COMPLETE CBC W/AUTO DIFF WBC: CPT | Performed by: INTERNAL MEDICINE

## 2021-04-11 LAB
RETICS # AUTO: 57.2 10E9/L (ref 25–95)
RETICS/RBC NFR AUTO: 1.6 % (ref 0.5–2)

## 2021-04-15 ENCOUNTER — VIRTUAL VISIT (OUTPATIENT)
Dept: ONCOLOGY | Facility: CLINIC | Age: 86
End: 2021-04-15
Attending: INTERNAL MEDICINE
Payer: MEDICARE

## 2021-04-15 DIAGNOSIS — D64.9 ANEMIA, UNSPECIFIED TYPE: Primary | ICD-10-CM

## 2021-04-15 PROCEDURE — 999N001193 HC VIDEO/TELEPHONE VISIT; NO CHARGE

## 2021-04-15 PROCEDURE — 99214 OFFICE O/P EST MOD 30 MIN: CPT | Mod: 95 | Performed by: INTERNAL MEDICINE

## 2021-04-15 NOTE — LETTER
4/15/2021         RE: Eddie Stephens  [id number]  [facility] [id number]        Dear Colleague,    Thank you for referring your patient, Eddie Stephens, to the [facility]. Please see a copy of my visit note below.    Hematology follow up visit:  Date on this visit: 4/15/2021     Eddie Stephens  is referred by Dr.Jenna Luis F Lee for a hematology consultation. He requires evaluation for Anemia.    Primary Physician: Thom Cruz         History Of Present Illness:  Please see my previous note for details.  I have copied and updated from prior note.    He initially saw me on 1/12/2021 for evaluation regarding anemia.    Mr. Stephens is a 89 year old male who presents with anemia.    I reviewed notes from primary care provider Esperanza Lee.      For the last 2 years, he has been feeling cold and also feels itching.  He feels tired over the last 2 years. Light headedness/dizziness has also been going on off and on. Sometimes he gets low BP.  He has not been eating well over the last 6 months. Has lost 10 lbs over the last 6 months. No night sweats. He gets nervous frequently. He has issues with burping and hiccups for at least 1 year. Last fall he had issues with difficulty swallowing/something stuck and he took fish oil which helped. Now he can swallow OK. He has some constipation for which he takes psyllium husk which helps.   No erythromelalgia. He has some leg edema and he wears support stockings which has helped.  Had recent thrush which was treated. No other infections,recently. Has dyspnea on exertion. He uses as needed albuterol. He has cough variant asthma.  No abnormal bleeding, melena, hematochezia. No pica symptoms. He has anxiety issues and has restless legs ( for 40 years ) for which he takes pramipexole.  On 10/6/2020 he was noted to have a hemoglobin of 9.7 with MCV 92.  14% monocytes were seen.    I did further work-up for  anemia.  Peripheral blood smear showed normocytic normochromic anemia.  Hemoglobin was 10.3.  He was noted to have a ferritin of 112, iron 34, TIBC 234 and iron saturation index 15%.  Folate and B12 were normal.  Erythropoietin was inappropriately low at 18.  Serum creatinine was 1.11.   Serum protein electrophoresis was unremarkable.      Interval history.    This is a video visit.  Patient's wife and daughter are also available throughout the visit as well.  The wife and daughter provide most of the history although patient also provides history.    Has had issues with nervousness and restless legs and sleep.   He is taking oral iron thice daily and this makes him more constipated. He thinks that he is able to tolerate twice daily better. He is using one senna S daily.  He twisted his right knee and it is swollen. [facility]      ROS:  Otherwise a comprehensive review of the system was negative.    I reviewed other history in epic as below.    Past Medical/Surgical History:  No past medical history on file.  No past surgical history on file.   MICHAEL  Anemia  Osteoarthritis- on Tramadol  Restless legs.  Anxiety/Depression  Cough variant Asthma  BPH  Thoracic/Abdominal aortic aneurysm  Right atrial enlargement  Legally blind- macular degeneration  Rosacea  Basal Cell Cancer of the skin.      Allergies:  Allergies as of 04/15/2021 - Reviewed 01/26/2021   Allergen Reaction Noted     Ambien [zolpidem]  12/03/2016     Detrol [tolterodine]  12/03/2016     Erythromycin  12/03/2016     Gabapentin  12/03/2016     Keflex [cephalexin] Hives 08/15/2008     Klonopin [clonazepam]  12/03/2016     Oxybutynin  12/03/2016     Prednisone Other (See Comments) 06/13/2019     Terazosin  12/03/2016     Current Medications:  Current Outpatient Medications   Medication Sig Dispense Refill     albuterol (PROAIR HFA/PROVENTIL HFA/VENTOLIN HFA) 108 (90 BASE) MCG/ACT Inhaler Inhale 2 puffs into the lungs every 6 hours        alfuzosin (UROXATRAL) 10 MG 24 hr tablet Take 10 mg by mouth every other day        benzonatate (TESSALON) 100 MG capsule Take 100 mg by mouth every 6 hours       Calcium Citrate-Vitamin D (CALCIUM CITRATE + D PO) Take by mouth 2 times daily       Cholecalciferol (VITAMIN D-3) 125 MCG (5000 UT) TABS Take by mouth daily       clotrimazole (MYCELEX) 10 MG lozenge Place 1 lozenge (10 mg) inside cheek 5 times daily 70 each 0     desmopressin (DDAVP) 0.1 MG tablet Take 1 tablet (100 mcg) by mouth At Bedtime (Patient not taking: Reported on 1/26/2021) 10 tablet 11     diclofenac (VOLTAREN) 1 % GEL topical gel Apply ~ 4 grams to shoulders four times daily using enclosed dosing card. 100 g 1     escitalopram (LEXAPRO) 10 MG tablet Take 1 tablet (10 mg) by mouth daily 90 tablet 0     ferrous sulfate 220 (44 Fe) MG/5ML LIQD Take by mouth daily       finasteride (PROSCAR) 5 MG tablet Take 5 mg by mouth daily       fluticasone-salmeterol (ADVAIR DISKUS) 250-50 MCG/DOSE inhaler daily       lidocaine patch in PLACE as needed       LUTEIN-ZEAXANTHIN PO        MAGNESIUM GLYCINATE PLUS PO        methocarbamol (ROBAXIN) 500 MG tablet Take 500 mg by mouth At Bedtime       metroNIDAZOLE (METROGEL) 0.75 % topical gel Apply topically 2 times daily       Multiple Vitamins-Minerals (PRESERVISION AREDS) CAPS        Multiple Vitamins-Minerals (ZINC PO)        Omega-3 Fatty Acids (FISH OIL PO) Take by mouth daily       pramipexole (MIRAPEX) 0.125 MG tablet Take 0.25 mg by mouth daily (with dinner)       Psyllium (METAMUCIL PO)        saw palmetto 450 MG CAPS capsule Take 540 mg by mouth daily        traMADol (ULTRAM) 50 MG tablet Take 100 mg by mouth At Bedtime        vitamin B complex with vitamin C (VITAMIN  B COMPLEX) tablet Take 1 tablet by mouth daily Every other day        Family History:  Family History   Problem Relation Age of Onset     Coronary Artery Disease Mother      Hypertension Mother      No family history of bleeding or  clotting disorder.    Social History:  Social History     Socioeconomic History     Marital status:      Spouse name: Not on file     Number of children: Not on file     Years of education: Not on file     Highest education level: Not on file   Occupational History     Not on file   Social Needs     Financial resource strain: Not on file     Food insecurity     Worry: Not on file     Inability: Not on file     Transportation needs     Medical: Not on file     Non-medical: Not on file   Tobacco Use     Smoking status: Former Smoker     Smokeless tobacco: Never Used   Substance and Sexual Activity     Alcohol use: Not Currently     Alcohol/week: 0.0 standard drinks     Drug use: Never     Sexual activity: Not Currently     Partners: Female   Lifestyle     Physical activity     Days per week: Not on file     Minutes per session: Not on file     Stress: Not on file   Relationships     Social connections     Talks on phone: Not on file     Gets together: Not on file     Attends Spiritism service: Not on file     Active member of club or organization: Not on file     Attends meetings of clubs or organizations: Not on file     Relationship status: Not on file     Intimate partner violence     Fear of current or ex partner: Not on file     Emotionally abused: Not on file     Physically abused: Not on file     Forced sexual activity: Not on file   Other Topics Concern     Parent/sibling w/ CABG, MI or angioplasty before 65F 55M? Not Asked   Social History Narrative     Not on file     Used to smoke pipe 30 years ago. No etoh. Lives with wife.     Physical Exam:  There were no vitals taken for this visit.   Wt Readings from Last 4 Encounters:   01/12/21 90.7 kg (200 lb)   02/28/19 90.3 kg (199 lb 1.6 oz)   11/03/17 88.9 kg (196 lb)   09/01/17 89.4 kg (197 lb)       Constitutional.  Does not seem to be in any acute distress.  Eyes.  No redness or discharge noted.  Respiratory.  Speaking in full sentences.  Breathing  seems comfortable without any accessory use of muscles.    Skin.  Visualized his skin does not show any obvious rashes.  Musculoskeletal.  Range of motion for visualized areas is intact.  Neurological.  Alert and oriented x3.  Psychiatric.  Mood, mentation and affect are normal.  Decision making capacity is intact.      The rest of a comprehensive physical examination is deferred due to [facility] video visit restrictions.    Laboratory/Imaging Studies      Reviewed  4/10/2021.  CBC shows WBC 6.1.  Hemoglobin 10.7 and platelets 183.    Iron studies show ferritin 134, iron 41, TIBC 191 and iron saturation index 21%.    In January 2021  Peripheral blood smear showed normocytic normochromic anemia.  Hemoglobin was 10.3.  Iron studies showed ferritin of 112, iron 34, TIBC 234 and iron saturation index 15%.    Folate and B12 were normal.    Erythropoietin was inappropriately low at 18.  BMP showed serum creatinine was 1.11.   Serum protein electrophoresis was unremarkable.      ASSESSMENT/PLAN:    Anemia.   The work-up shows that he has normochromic normocytic anemia which likely is a combination of anemia of chronic disease and relative erythropoietin deficiency.  He has been taking oral iron 3 times a day and is tolerating it well.  My recommendation would be to keep iron stores in the higher range and try to keep iron saturation 30% or above.  Over the last couple of months we have made some improvement in the sense that his hemoglobin is a little better and iron saturation is also now 21%.   He is noticing more constipation with thrice daily dosing and I recommend that he can go down to twice a day dosing which he was able to tolerate better.  He should continue taking oral iron with vitamin C for better absorption. I believe we can hold off on intravenous iron for now.  I do not believe that at this time we should give him erythropoietin as hemoglobin is consistently above 10.    We discussed that  several of his chronic issues including nervousness, jitteriness, restless legs and sleep issues etc. are not related to hematological problem and he should follow up with PCP regarding them.    Going forward he will follow with PCP with serial checks of his labs and see me on an as-needed basis.      All of his and his family's questions were answered to their satisfaction.  They are agreeable and comfortable with the plan.    Bogdan Valladares MD      Tyshawn is a 89 year old who is being evaluated via a billable video visit.      How would you like to obtain your AVS? MyChart  If the video visit is dropped, the invitation should be resent by: Send to e-mail at: [email or web address]  Will anyone else be joining your video visit? No     Vitals - Patient Reported  Systolic (Patient Reported): 122  Diastolic (Patient Reported): 62  Weight (Patient Reported): 81.6 kg (180 lb)  Temperature (Patient Reported): 96.6  F (35.9  C)  Pulse (Patient Reported): 65  Pain Score: No Pain (0)    Ernestina DE LA PAZ    Concerns: Lab works, and restless leg.    Video Start Time: 11:00 AM  Video-Visit Details    Type of service:  Video Visit    Video End Time:11:17AM    Originating Location (pt. Location): Home    Distant Location (provider location):  [facility]     Platform used for Video Visit: [employer]      Again, thank you for allowing me to participate in the care of your patient.        Sincerely,        Bogdan Valladares MD

## 2021-04-15 NOTE — PROGRESS NOTES
Tyshawn is a 89 year old who is being evaluated via a billable video visit.      How would you like to obtain your AVS? MyChart  If the video visit is dropped, the invitation should be resent by: Send to e-mail at: [email or web address]  Will anyone else be joining your video visit? No     Vitals - Patient Reported  Systolic (Patient Reported): 122  Diastolic (Patient Reported): 62  Weight (Patient Reported): 81.6 kg (180 lb)  Temperature (Patient Reported): 96.6  F (35.9  C)  Pulse (Patient Reported): 65  Pain Score: No Pain (0)    Ernestina DE LA PAZ    Concerns: Lab works, and restless leg.    Video Start Time: 11:00 AM  Video-Visit Details    Type of service:  Video Visit    Video End Time:11:17AM    Originating Location (pt. Location): Home    Distant Location (provider location):  [facility]     Platform used for Video Visit: [email or web address]

## 2021-04-15 NOTE — PROGRESS NOTES
Hematology follow up visit:  Date on this visit: 4/15/2021     Eddie Stephens  is referred by Dr.Jenna Luis F Lee for a hematology consultation. He requires evaluation for Anemia.    Primary Physician: Thom Cruz         History Of Present Illness:  Please see my previous note for details.  I have copied and updated from prior note.    He initially saw me on 1/12/2021 for evaluation regarding anemia.    Mr. Stephens is a 89 year old male who presents with anemia.    I reviewed notes from primary care provider Esperanza Lee.      For the last 2 years, he has been feeling cold and also feels itching.  He feels tired over the last 2 years. Light headedness/dizziness has also been going on off and on. Sometimes he gets low BP.  He has not been eating well over the last 6 months. Has lost 10 lbs over the last 6 months. No night sweats. He gets nervous frequently. He has issues with burping and hiccups for at least 1 year. Last fall he had issues with difficulty swallowing/something stuck and he took fish oil which helped. Now he can swallow OK. He has some constipation for which he takes psyllium husk which helps.   No erythromelalgia. He has some leg edema and he wears support stockings which has helped.  Had recent thrush which was treated. No other infections,recently. Has dyspnea on exertion. He uses as needed albuterol. He has cough variant asthma.  No abnormal bleeding, melena, hematochezia. No pica symptoms. He has anxiety issues and has restless legs ( for 40 years ) for which he takes pramipexole.  On 10/6/2020 he was noted to have a hemoglobin of 9.7 with MCV 92.  14% monocytes were seen.    I did further work-up for anemia.  Peripheral blood smear showed normocytic normochromic anemia.  Hemoglobin was 10.3.  He was noted to have a ferritin of 112, iron 34, TIBC 234 and iron saturation index 15%.  Folate and B12 were normal.  Erythropoietin was inappropriately low at 18.  Serum creatinine was  1.11.   Serum protein electrophoresis was unremarkable.      Interval history.    This is a video visit.  Patient's wife and daughter are also available throughout the visit as well.  The wife and daughter provide most of the history although patient also provides history.    Has had issues with nervousness and restless legs and sleep.   He is taking oral iron thice daily and this makes him more constipated. He thinks that he is able to tolerate twice daily better. He is using one senna S daily.  He twisted his right knee and it is swollen. [facility]      ROS:  Otherwise a comprehensive review of the system was negative.    I reviewed other history in epic as below.    Past Medical/Surgical History:  No past medical history on file.  No past surgical history on file.   MICHAEL  Anemia  Osteoarthritis- on Tramadol  Restless legs.  Anxiety/Depression  Cough variant Asthma  BPH  Thoracic/Abdominal aortic aneurysm  Right atrial enlargement  Legally blind- macular degeneration  Rosacea  Basal Cell Cancer of the skin.      Allergies:  Allergies as of 04/15/2021 - Reviewed 01/26/2021   Allergen Reaction Noted     Ambien [zolpidem]  12/03/2016     Detrol [tolterodine]  12/03/2016     Erythromycin  12/03/2016     Gabapentin  12/03/2016     Keflex [cephalexin] Hives 08/15/2008     Klonopin [clonazepam]  12/03/2016     Oxybutynin  12/03/2016     Prednisone Other (See Comments) 06/13/2019     Terazosin  12/03/2016     Current Medications:  Current Outpatient Medications   Medication Sig Dispense Refill     albuterol (PROAIR HFA/PROVENTIL HFA/VENTOLIN HFA) 108 (90 BASE) MCG/ACT Inhaler Inhale 2 puffs into the lungs every 6 hours       alfuzosin (UROXATRAL) 10 MG 24 hr tablet Take 10 mg by mouth every other day        benzonatate (TESSALON) 100 MG capsule Take 100 mg by mouth every 6 hours       Calcium Citrate-Vitamin D (CALCIUM CITRATE + D PO) Take by mouth 2 times daily       Cholecalciferol (VITAMIN D-3)  125 MCG (5000 UT) TABS Take by mouth daily       clotrimazole (MYCELEX) 10 MG lozenge Place 1 lozenge (10 mg) inside cheek 5 times daily 70 each 0     desmopressin (DDAVP) 0.1 MG tablet Take 1 tablet (100 mcg) by mouth At Bedtime (Patient not taking: Reported on 1/26/2021) 10 tablet 11     diclofenac (VOLTAREN) 1 % GEL topical gel Apply ~ 4 grams to shoulders four times daily using enclosed dosing card. 100 g 1     escitalopram (LEXAPRO) 10 MG tablet Take 1 tablet (10 mg) by mouth daily 90 tablet 0     ferrous sulfate 220 (44 Fe) MG/5ML LIQD Take by mouth daily       finasteride (PROSCAR) 5 MG tablet Take 5 mg by mouth daily       fluticasone-salmeterol (ADVAIR DISKUS) 250-50 MCG/DOSE inhaler daily       lidocaine patch in PLACE as needed       LUTEIN-ZEAXANTHIN PO        MAGNESIUM GLYCINATE PLUS PO        methocarbamol (ROBAXIN) 500 MG tablet Take 500 mg by mouth At Bedtime       metroNIDAZOLE (METROGEL) 0.75 % topical gel Apply topically 2 times daily       Multiple Vitamins-Minerals (PRESERVISION AREDS) CAPS        Multiple Vitamins-Minerals (ZINC PO)        Omega-3 Fatty Acids (FISH OIL PO) Take by mouth daily       pramipexole (MIRAPEX) 0.125 MG tablet Take 0.25 mg by mouth daily (with dinner)       Psyllium (METAMUCIL PO)        saw palmetto 450 MG CAPS capsule Take 540 mg by mouth daily        traMADol (ULTRAM) 50 MG tablet Take 100 mg by mouth At Bedtime        vitamin B complex with vitamin C (VITAMIN  B COMPLEX) tablet Take 1 tablet by mouth daily Every other day        Family History:  Family History   Problem Relation Age of Onset     Coronary Artery Disease Mother      Hypertension Mother      No family history of bleeding or clotting disorder.    Social History:  Social History     Socioeconomic History     Marital status:      Spouse name: Not on file     Number of children: Not on file     Years of education: Not on file     Highest education level: Not on file   Occupational History     Not  on file   Social Needs     Financial resource strain: Not on file     Food insecurity     Worry: Not on file     Inability: Not on file     Transportation needs     Medical: Not on file     Non-medical: Not on file   Tobacco Use     Smoking status: Former Smoker     Smokeless tobacco: Never Used   Substance and Sexual Activity     Alcohol use: Not Currently     Alcohol/week: 0.0 standard drinks     Drug use: Never     Sexual activity: Not Currently     Partners: Female   Lifestyle     Physical activity     Days per week: Not on file     Minutes per session: Not on file     Stress: Not on file   Relationships     Social connections     Talks on phone: Not on file     Gets together: Not on file     Attends Restoration service: Not on file     Active member of club or organization: Not on file     Attends meetings of clubs or organizations: Not on file     Relationship status: Not on file     Intimate partner violence     Fear of current or ex partner: Not on file     Emotionally abused: Not on file     Physically abused: Not on file     Forced sexual activity: Not on file   Other Topics Concern     Parent/sibling w/ CABG, MI or angioplasty before 65F 55M? Not Asked   Social History Narrative     Not on file     Used to smoke pipe 30 years ago. No etoh. Lives with wife.     Physical Exam:  There were no vitals taken for this visit.   Wt Readings from Last 4 Encounters:   01/12/21 90.7 kg (200 lb)   02/28/19 90.3 kg (199 lb 1.6 oz)   11/03/17 88.9 kg (196 lb)   09/01/17 89.4 kg (197 lb)       Constitutional.  Does not seem to be in any acute distress.  Eyes.  No redness or discharge noted.  Respiratory.  Speaking in full sentences.  Breathing seems comfortable without any accessory use of muscles.    Skin.  Visualized his skin does not show any obvious rashes.  Musculoskeletal.  Range of motion for visualized areas is intact.  Neurological.  Alert and oriented x3.  Psychiatric.  Mood, mentation and affect are normal.   Decision making capacity is intact.      The rest of a comprehensive physical examination is deferred due to [facility] video visit restrictions.    Laboratory/Imaging Studies      Reviewed  4/10/2021.  CBC shows WBC 6.1.  Hemoglobin 10.7 and platelets 183.    Iron studies show ferritin 134, iron 41, TIBC 191 and iron saturation index 21%.    In January 2021  Peripheral blood smear showed normocytic normochromic anemia.  Hemoglobin was 10.3.  Iron studies showed ferritin of 112, iron 34, TIBC 234 and iron saturation index 15%.    Folate and B12 were normal.    Erythropoietin was inappropriately low at 18.  BMP showed serum creatinine was 1.11.   Serum protein electrophoresis was unremarkable.      ASSESSMENT/PLAN:    Anemia.   The work-up shows that he has normochromic normocytic anemia which likely is a combination of anemia of chronic disease and relative erythropoietin deficiency.  He has been taking oral iron 3 times a day and is tolerating it well.  My recommendation would be to keep iron stores in the higher range and try to keep iron saturation 30% or above.  Over the last couple of months we have made some improvement in the sense that his hemoglobin is a little better and iron saturation is also now 21%.   He is noticing more constipation with thrice daily dosing and I recommend that he can go down to twice a day dosing which he was able to tolerate better.  He should continue taking oral iron with vitamin C for better absorption. I believe we can hold off on intravenous iron for now.  I do not believe that at this time we should give him erythropoietin as hemoglobin is consistently above 10.    We discussed that several of his chronic issues including nervousness, jitteriness, restless legs and sleep issues etc. are not related to hematological problem and he should follow up with PCP regarding them.    Going forward he will follow with PCP with serial checks of his labs and see me on an  as-needed basis.      All of his and his family's questions were answered to their satisfaction.  They are agreeable and comfortable with the plan.    Bogdan Valladares MD

## 2021-04-15 NOTE — PATIENT INSTRUCTIONS
Continue oral iron.  Follow-up with PCP with serial checks of labs and see me on an as-needed basis.

## 2021-05-11 ASSESSMENT — ENCOUNTER SYMPTOMS
PARESTHESIAS: 1
ABDOMINAL PAIN: 0
SHORTNESS OF BREATH: 0
HEADACHES: 0
NAUSEA: 0
COUGH: 0
DIZZINESS: 1
ARTHRALGIAS: 1
CONSTIPATION: 1
SORE THROAT: 0
FEVER: 0
HEMATOCHEZIA: 0
HEMATURIA: 0
WEAKNESS: 0
CHILLS: 0
MYALGIAS: 0
DYSURIA: 0
EYE PAIN: 0
HEARTBURN: 0
PALPITATIONS: 0
JOINT SWELLING: 1
FREQUENCY: 1
NERVOUS/ANXIOUS: 1

## 2021-05-11 ASSESSMENT — ACTIVITIES OF DAILY LIVING (ADL)
CURRENT_FUNCTION: PREPARING MEALS REQUIRES ASSISTANCE
CURRENT_FUNCTION: MONEY MANAGEMENT REQUIRES ASSISTANCE
CURRENT_FUNCTION: HOUSEWORK REQUIRES ASSISTANCE
CURRENT_FUNCTION: BATHING REQUIRES ASSISTANCE
CURRENT_FUNCTION: TRANSPORTATION REQUIRES ASSISTANCE
CURRENT_FUNCTION: LAUNDRY REQUIRES ASSISTANCE
CURRENT_FUNCTION: MEDICATION ADMINISTRATION REQUIRES ASSISTANCE
CURRENT_FUNCTION: SHOPPING REQUIRES ASSISTANCE

## 2021-05-13 ENCOUNTER — OFFICE VISIT (OUTPATIENT)
Dept: FAMILY MEDICINE | Facility: CLINIC | Age: 86
End: 2021-05-13
Payer: MEDICARE

## 2021-05-13 VITALS
OXYGEN SATURATION: 96 % | SYSTOLIC BLOOD PRESSURE: 120 MMHG | DIASTOLIC BLOOD PRESSURE: 72 MMHG | BODY MASS INDEX: 26.98 KG/M2 | HEART RATE: 74 BPM | TEMPERATURE: 98 F | WEIGHT: 182.2 LBS | RESPIRATION RATE: 16 BRPM | HEIGHT: 69 IN

## 2021-05-13 DIAGNOSIS — K59.00 CONSTIPATION, UNSPECIFIED CONSTIPATION TYPE: ICD-10-CM

## 2021-05-13 DIAGNOSIS — M25.461 SWELLING OF KNEE JOINT, RIGHT: ICD-10-CM

## 2021-05-13 DIAGNOSIS — G25.81 RESTLESS LEGS SYNDROME: ICD-10-CM

## 2021-05-13 DIAGNOSIS — D64.9 ANEMIA, UNSPECIFIED TYPE: ICD-10-CM

## 2021-05-13 DIAGNOSIS — Z00.00 ENCOUNTER FOR MEDICARE ANNUAL WELLNESS EXAM: Primary | ICD-10-CM

## 2021-05-13 PROCEDURE — G0439 PPPS, SUBSEQ VISIT: HCPCS | Performed by: PHYSICIAN ASSISTANT

## 2021-05-13 PROCEDURE — 99214 OFFICE O/P EST MOD 30 MIN: CPT | Mod: 25 | Performed by: PHYSICIAN ASSISTANT

## 2021-05-13 RX ORDER — BENZONATATE 100 MG/1
100 CAPSULE ORAL EVERY 6 HOURS
COMMUNITY
Start: 2021-05-13 | End: 2022-01-17

## 2021-05-13 ASSESSMENT — ENCOUNTER SYMPTOMS
DYSURIA: 0
DIZZINESS: 1
FEVER: 0
SHORTNESS OF BREATH: 0
SORE THROAT: 0
ARTHRALGIAS: 1
CHILLS: 0
CONSTIPATION: 1
HEADACHES: 0
MYALGIAS: 0
FREQUENCY: 1
ABDOMINAL PAIN: 0
JOINT SWELLING: 1
HEMATOCHEZIA: 0
PALPITATIONS: 0
WEAKNESS: 0
COUGH: 0
HEMATURIA: 0
PARESTHESIAS: 1
EYE PAIN: 0
NERVOUS/ANXIOUS: 1
NAUSEA: 0
HEARTBURN: 0

## 2021-05-13 ASSESSMENT — ACTIVITIES OF DAILY LIVING (ADL)
CURRENT_FUNCTION: TRANSPORTATION REQUIRES ASSISTANCE
CURRENT_FUNCTION: PREPARING MEALS REQUIRES ASSISTANCE
CURRENT_FUNCTION: MEDICATION ADMINISTRATION REQUIRES ASSISTANCE
CURRENT_FUNCTION: LAUNDRY REQUIRES ASSISTANCE
CURRENT_FUNCTION: MONEY MANAGEMENT REQUIRES ASSISTANCE
CURRENT_FUNCTION: HOUSEWORK REQUIRES ASSISTANCE
CURRENT_FUNCTION: SHOPPING REQUIRES ASSISTANCE
CURRENT_FUNCTION: BATHING REQUIRES ASSISTANCE

## 2021-05-13 ASSESSMENT — MIFFLIN-ST. JEOR: SCORE: 1436.83

## 2021-05-13 ASSESSMENT — PAIN SCALES - GENERAL: PAINLEVEL: NO PAIN (0)

## 2021-05-13 NOTE — PROGRESS NOTES
"SUBJECTIVE:   Eddie Stephens is a 89 year old male who presents for Preventive Visit.      Patient has been advised of split billing requirements and indicates understanding: Yes   Are you in the first 12 months of your Medicare coverage?  No    Healthy Habits:     In general, how would you rate your overall health?  Fair    Frequency of exercise:  None    Do you usually eat at least 4 servings of fruit and vegetables a day, include whole grains    & fiber and avoid regularly eating high fat or \"junk\" foods?  Yes    Taking medications regularly:  Yes    Medication side effects:  Not applicable    Ability to successfully perform activities of daily living:  Transportation requires assistance, shopping requires assistance, preparing meals requires assistance, housework requires assistance, bathing requires assistance, laundry requires assistance, medication administration requires assistance and money management requires assistance    Home Safety:  No safety concerns identified    Hearing Impairment:  Difficulty following a conversation in a noisy restaurant or crowded room, feel that people are mumbling or not speaking clearly, difficulty following dialogue in the theater, difficult to understand a speaker at a public meeting or [employer] service, need to ask people to speak up or repeat themselves, find that men's voices are easier to understand than woman's, difficulty understanding soft or whispered speech and difficulty understanding speech on the telephone    In the past 6 months, have you been bothered by leaking of urine?  No    In general, how would you rate your overall mental or emotional health?  Fair      PHQ-2 Total Score: 2    Additional concerns today:  Yes (right keen issues, constipation, go over lab results from [facility], restless legs/ insomnia)    SLEEP:  Worsening over the past 6 weeks; last night only 20 minutes; [facility]    In December started with constipation; " around the time with the initiation of iron supplement  Started on senna and then moved to milk of magnesia  Difficult sleep with RLS  As above - only 20 minutes last night  Has tried and failed multiple other medications  Have tried unisom; effective for sleep and then seemed to agitate      Do you feel safe in your environment? Yes    Have you ever done Advance Care Planning? (For example, a Health Directive, POLST, or a discussion with a medical provider or your loved ones about your wishes): Yes, advance care planning is on file.      Fall risk  Fallen 2 or more times in the past year?: Yes  Any fall with injury in the past year?: Yes    Cognitive Screening   1) Repeat 3 items (Leader, Season, Table)    2) Clock draw: ABNORMAL unable to see   3) 3 item recall: Recalls NO objects   Results: difficult to see/perform exam. No concerns with cognition per patient or daughter    Mini-CogTM Copyright MELONIE Leggett. Licensed by the author for use in [facility]; reprinted with permission ([email or web address]). All rights reserved.      Do you have sleep apnea, excessive snoring or daytime drowsiness?: Yes - poor sleep    Reviewed and updated as needed this visit by clinical staff  Tobacco  Allergies    Med Hx  Surg Hx  Fam Hx  Soc Hx        Reviewed and updated as needed this visit by Provider                Social History     Tobacco Use     Smoking status: Former Smoker     Smokeless tobacco: Never Used   Substance Use Topics     Alcohol use: Not Currently     Alcohol/week: 0.0 standard drinks         Alcohol Use 5/11/2021   Prescreen: >3 drinks/day or >7 drinks/week? Not Applicable         Current providers sharing in care for this patient include:   Patient Care Team:  Thom Cruz PA-C as PCP - General (Physician Assistant)  Thom rCuz PA-C as Physician Assistant (Physician Assistant)  Thom Cruz PA-C as Assigned PCP  Bogdan Valladares MD as Assigned Cancer Care Provider    The  "following health maintenance items are reviewed in Epic and correct as of today:  Health Maintenance Due   Topic Date Due     ANNUAL REVIEW OF HM ORDERS  Never done     Lab work is in process    Pertinent mammograms are reviewed under the imaging tab.    Review of Systems   Constitutional: Negative for chills and fever.   HENT: Positive for hearing loss. Negative for congestion, ear pain and sore throat.    Eyes: Positive for visual disturbance. Negative for pain.   Respiratory: Negative for cough and shortness of breath.    Cardiovascular: Positive for peripheral edema. Negative for chest pain and palpitations.   Gastrointestinal: Positive for constipation. Negative for abdominal pain, heartburn, hematochezia and nausea.   Genitourinary: Positive for frequency, impotence and urgency. Negative for discharge, dysuria, genital sores and hematuria.   Musculoskeletal: Positive for arthralgias and joint swelling. Negative for myalgias.   Skin: Negative for rash.   Neurological: Positive for dizziness and paresthesias. Negative for weakness and headaches.   Psychiatric/Behavioral: Negative for mood changes. The patient is nervous/anxious.        OBJECTIVE:   /72 (BP Location: Right arm, Patient Position: Chair, Cuff Size: Adult Regular)   Pulse 74   Temp 98  F (36.7  C) (Oral)   Resp 16   Ht 1.753 m (5' 9\")   Wt 82.6 kg (182 lb 3.2 oz)   SpO2 96%   BMI 26.91 kg/m   Estimated body mass index is 26.91 kg/m  as calculated from the following:    Height as of this encounter: 1.753 m (5' 9\").    Weight as of this encounter: 82.6 kg (182 lb 3.2 oz).  Physical Exam  GENERAL: healthy, alert and no distress  EYES: Eyes grossly normal to inspection, PERRL and conjunctivae and sclerae normal  NECK: no adenopathy, no asymmetry, masses, or scars and thyroid normal to palpation  RESP: lungs clear to auscultation - no rales, rhonchi or wheezes  CV: regular rates and rhythm with systolic murmur  ABDOMEN: soft, nontender, no " "hepatosplenomegaly, no masses and bowel sounds normal  MS: No peripheral edema; there a mild effusion over a hard bony process at the right lateral knee space  NEURO: Normal strength and tone, mentation intact and speech normal  PSYCH: mentation appears normal, affect normal/bright    Diagnostic Test Results:  Labs reviewed in Epic    ASSESSMENT / PLAN:   1. Encounter for Medicare annual wellness exam  Reviewed personal and family history. Reviewed age appropriate screenings. Recommended any needed vaccinations.    2. Restless legs syndrome  Terribly worsened. Has tried and failed many Rx and there are contraindications to others. WIth the worsening sleep, recommend increased trial of unisom initially to help with sleep.     3. Swelling of knee joint, right  Monitor. There is no ttp or ligamentous laxity on the exam    4. Anemia, unspecified type  Discussed today. He has had significant BMs since starting iron but seen close to no symptomatic improvement. We'll stop the iron completely and recheck labs in 6-12 weeks. If there is a precipitous drop or an increase of his symptoms of fatigue or shortness of breath, we'll revisit the therapy. However at this point quality of life seems more pertinent.   - CBC with platelets differential; Future  - Ferritin; Future  - Iron and iron binding capacity; Future    5. Constipation, unspecified constipation type  See isaac      Patient has been advised of split billing requirements and indicates understanding: Yes  COUNSELING:  Reviewed preventive health counseling, as reflected in patient instructions    Estimated body mass index is 26.91 kg/m  as calculated from the following:    Height as of this encounter: 1.753 m (5' 9\").    Weight as of this encounter: 82.6 kg (182 lb 3.2 oz).      He reports that he has quit smoking. He has never used smokeless tobacco.      Appropriate preventive services were discussed with this patient, including applicable screening as appropriate " for cardiovascular disease, diabetes, osteopenia/osteoporosis, and glaucoma.  As appropriate for age/gender, discussed screening for colorectal cancer, prostate cancer, breast cancer, and cervical cancer. Checklist reviewing preventive services available has been given to the patient.    Reviewed patients plan of care and provided an AVS. The Basic Care Plan (routine screening as documented in Health Maintenance) for Eddie meets the Care Plan requirement. This Care Plan has been established and reviewed with the Patient and daughter.    Counseling Resources:  ATP IV Guidelines  Pooled Cohorts Equation Calculator  Breast Cancer Risk Calculator  Breast Cancer: Medication to Reduce Risk  FRAX Risk Assessment  ICSI Preventive Guidelines  Dietary Guidelines for Americans, 2010  [employer]'s MyPlate  ASA Prophylaxis  Lung CA Screening    Thom Cruz PA-C  [facility]    Identified Health Risks:    The patient was provided with suggestions to help him develop a healthy physical lifestyle.  He is at risk for lack of exercise and has been provided with information to increase physical activity for the benefit of his well-being.  The patient reports that he has difficulty with activities of daily living. I have asked that the patient make a follow up appointment in 53 weeks where this issue will be further evaluated and addressed.  The patient was provided with written information regarding signs of hearing loss.  The patient was provided with suggestions to help him develop a healthy emotional lifestyle.

## 2021-05-13 NOTE — PATIENT INSTRUCTIONS
Patient Education   Personalized Prevention Plan  You are due for the preventive services outlined below.  Your care team is available to assist you in scheduling these services.  If you have already completed any of these items, please share that information with your care team to update in your medical record.  Health Maintenance Due   Topic Date Due     ANNUAL REVIEW OF  ORDERS  Never done     Your Health Risk Assessment indicates you feel you are not in good health    A healthy lifestyle helps keep the body fit and the mind alert. It helps protect you from disease, helps you fight disease, and helps prevent chronic disease (disease that doesn't go away) from getting worse. This is important as you get older and begin to notice twinges in muscles and joints and a decline in the strength and stamina you once took for granted. A healthy lifestyle includes good healthcare, good nutrition, weight control, recreation, and regular exercise. Avoid harmful substances and do what you can to keep safe. Another part of a healthy lifestyle is stay mentally active and socially involved.    Good healthcare     Have a wellness visit every year.     If you have new symptoms, let us know right away. Don't wait until the next checkup.     Take medicines exactly as prescribed and keep your medicines in a safe place. Tell us if your medicine causes problems.   Healthy diet and weight control     Eat 3 or 4 small, nutritious, low-fat, high-fiber meals a day. Include a variety of fruits, vegetables, and whole-grain foods.     Make sure you get enough calcium in your diet. Calcium, vitamin D, and exercise help prevent osteoporosis (bone thinning).     If you live alone, try eating with others when you can. That way you get a good meal and have company while you eat it.     Try to keep a healthy weight. If you eat more calories than your body uses for energy, it will be stored as fat and you will gain weight.     Recreation    Recreation is not limited to sports and team events. It includes any activity that provides relaxation, interest, enjoyment, and exercise. Recreation provides an outlet for physical, mental, and social energy. It can give a sense of worth and achievement. It can help you stay healthy.    Mental Exercise and Social Involvement  Mental and emotional health is as important as physical health. Keep in touch with friends and family. Stay as active as possible. Continue to learn and challenge yourself.   Things you can do to stay mentally active are:    Learn something new, like a foreign language or musical instrument.     Play SCRABBLE or do crossword puzzles. If you cannot find people to play these games with you at home, you can play them with others on your computer through the Internet.     Join a games club--anything from card games to chess or checkers or lawn bowling.     Start a new hobby.     Go back to school.     Volunteer.     Read.   Keep up with world events.    Exercise for a Healthier Heart  You may wonder how you can improve the health of your heart. If you re thinking about exercise, you re on the right track. You don t need to become an athlete. But you do need a certain amount of brisk exercise to help strengthen your heart. If you have been diagnosed with a heart condition, your healthcare provider may advise exercise to help stabilize your condition. To help make exercise a habit, choose safe, fun activities.      Exercise with a friend. When activity is fun, you're more likely to stick with it.   Before you start  Check with your healthcare provider before starting an exercise program. This is especially important if you have not been active for a while. It's also important if you have a long-term (chronic) health problem such as heart disease, diabetes, or obesity. Or if you are at high risk for having these problems.   Why exercise?  Exercising regularly offers many healthy rewards. It can  help you do all of the following:     Improve your blood cholesterol level to help prevent further heart trouble    Lower your blood pressure to help prevent a stroke or heart attack    Control diabetes, or reduce your risk of getting this disease    Improve your heart and lung function    Reach and stay at a healthy weight    Make your muscles stronger so you can stay active    Prevent falls and fractures by slowing the loss of bone mass (osteoporosis)    Manage stress better    Reduce your blood pressure    Improve your sense of self and your body image  Exercise tips      Ease into your routine. Set small goals. Then build on them. If you are not sure what your activity level should be, talk with your healthcare provider first before starting an exercise routine.    Exercise on most days. Aim for a total of 150 minutes (2 hours and 30 minutes) or more of moderate-intensity aerobic activity each week. Or 75 minutes (1 hour and 15 minutes) or more of vigorous-intensity aerobic activity each week. Or try for a combination of both. Moderate activity means that you breathe heavier and your heart rate increases but you can still talk. Think about doing 40 minutes of moderate exercise, 3 to 4 times a week. For best results, activity should last for about 40 minutes to lower blood pressure and cholesterol. It's OK to work up to the 40-minute period over time. Examples of moderate-intensity activity are walking 1 mile in 15 minutes. Or doing 30 to 45 minutes of yard work.    Step up your daily activity level.  Along with your exercise program, try being more active the whole day. Walk instead of drive. Or park further away so that you take more steps each day. Do more household tasks or yard work. You may not be able to meet the advised mount of physical activity. But doing some moderate- or vigorous-intensity aerobic activity can help reduce your risk for heart disease. Your healthcare provider can help you figure out  what is best for you.    Choose 1 or more activities you enjoy.  Walking is one of the easiest things you can do. You can also try swimming, riding a bike, dancing, or taking an exercise class.    When to call your healthcare provider  Call your healthcare provider if you have any of these:     Chest pain or feel dizzy or lightheaded    Burning, tightness, pressure, or heaviness in your chest, neck, shoulders, back, or arms    Abnormal shortness of breath    More joint or muscle pain    A very fast or irregular heartbeat (palpitations)  [employer] last reviewed this educational content on 7/1/2019 2000-2021 [employer]. All rights reserved. This information is not intended as a substitute for professional medical care. Always follow your healthcare professional's instructions.        Activities of Daily Living    Your Health Risk Assessment indicates you have difficulties with activities of daily living such as housework, bathing, preparing meals, taking medication, etc. Please make a follow up appointment for us to address this issue in more detail.    Signs of Hearing Loss      Hearing much better with one ear can be a sign of hearing loss.   Hearing loss is a problem shared by many people. In fact, it is one of the most common health problems, particularly as people age. Most people age 65 and older have some hearing loss. By age 80, almost everyone does. Hearing loss often occurs slowly over the years. So you may not realize your hearing has gotten worse.  Have your hearing checked  Call your healthcare provider if you:    Have to strain to hear normal conversation    Have to watch other people s faces very carefully to follow what they re saying    Need to ask people to repeat what they ve said    Often misunderstand what people are saying    Turn the volume of the television or radio up so high that others complain    Feel that people are mumbling when they re talking to you    Find that the  effort to hear leaves you feeling tired and irritated    Notice, when using the phone, that you hear better with one ear than the other  [employer] last reviewed this educational content on 1/1/2020 2000-2021 [employer]. All rights reserved. This information is not intended as a substitute for professional medical care. Always follow your healthcare professional's instructions.        Your Health Risk Assessment indicates you feel you are not in good emotional health.    Recreation   Recreation is not limited to sports and team events. It includes any activity that provides relaxation, interest, enjoyment, and exercise. Recreation provides an outlet for physical, mental, and social energy. It can give a sense of worth and achievement. It can help you stay healthy.    Mental Exercise and Social Involvement  Mental and emotional health is as important as physical health. Keep in touch with friends and family. Stay as active as possible. Continue to learn and challenge yourself.   Things you can do to stay mentally active are:    Learn something new, like a foreign language or musical instrument.     Play SCRABBLE or do crossword puzzles. If you cannot find people to play these games with you at home, you can play them with others on your computer through the Internet.     Join a games club--anything from card games to chess or checkers or lawn bowling.     Start a new hobby.     Go back to school.     Volunteer.     Read.   Keep up with world events.     At your visit today, we discussed your risk for falls and preventive options.    Fall Prevention  Falls often occur due to slipping, tripping or losing your balance. Millions of people fall every year and injure themselves. Here are ways to reduce your risk of falling again.     Think about your fall, was there anything that caused your fall that can be fixed, removed, or replaced?    Make your home safe by keeping walkways clear of objects you may  trip over, such as electric cords.    Use non-slip pads under rugs. Don't use area rugs or small throw rugs.    Use non-slip mats in bathtubs and showers.    Install handrails and lights on staircases. The handrails should be on both sides of the stairs.    Don't walk in poorly lit areas.    Don't stand on chairs or wobbly ladders.    Use caution when reaching overhead or looking upward. This position can cause a loss of balance.    Be sure your shoes fit properly, have non-slip bottoms and are in good condition.     Wear shoes both inside and out. Don't go barefoot or wear slippers.    Be cautious when going up and down stairs, curbs, and when walking on uneven sidewalks.    If your balance is poor, consider using a cane or walker.    If your fall was related to alcohol use, stop or limit alcohol intake.     If your fall was related to use of sleeping medicines, talk to your healthcare provider about this. You may need to reduce your dosage at bedtime if you awaken during the night to go to the bathroom.      To reduce the need for nighttime bathroom trips:  ? Don't drink fluids for several hours before going to bed  ? Empty your bladder before going to bed  ? Men can keep a urinal at the bedside    Stay as active as you can. Balance, flexibility, strength, and endurance all come from exercise. They all play a role in preventing falls. Ask your healthcare provider which types of activity are right for you.    Get your vision checked on a regular basis.    If you have pets, know where they are before you stand up or walk so you don't trip over them.    Use night lights.    Go over all your medicines with a pharmacist or other healthcare provider to see if any of them could make you more likely to fall.  [employer] last reviewed this educational content on 4/1/2018 2000-2021 [employer]. All rights reserved. This information is not intended as a substitute for professional medical care. Always follow  your healthcare professional's instructions

## 2021-05-16 ENCOUNTER — NURSE TRIAGE (OUTPATIENT)
Dept: NURSING | Facility: CLINIC | Age: 86
End: 2021-05-16

## 2021-05-16 NOTE — TELEPHONE ENCOUNTER
Daughter calling. B/p 76/41 just returned from [facility], lightheaded and dizzy normal 108/58 sometimes 90/50. They will call 911 to have him transported to the ER.  Gaby Rodriguez RN  [facility]      Reason for Disposition    [1] Systolic BP < 90 AND [2] dizzy, lightheaded, or weak    Additional Information    Negative: Started suddenly after an allergic medicine, an allergic food, or bee sting     79 heart rate, 76/41    Negative: Shock suspected (e.g., cold/pale/clammy skin, too weak to stand, low BP, rapid pulse)    Negative: Difficult to awaken or acting confused (e.g., disoriented, slurred speech)    Negative: Fainted    Protocols used: LOW BLOOD PRESSURE-A-

## 2021-05-20 ENCOUNTER — TELEPHONE (OUTPATIENT)
Dept: FAMILY MEDICINE | Facility: CLINIC | Age: 86
End: 2021-05-20

## 2021-05-20 NOTE — TELEPHONE ENCOUNTER
Patient's daughter Susan would like to know if Thom can see patient tomorrow instead on Tuesday. Patient's wife is having to give patient at least 3 fiss bath day to help with hemorrhoids and feel they can't wait until Tuesday. Please call Susan at [phone].  Kiley Gonsales   [facility]  Central Scheduler

## 2021-05-21 ENCOUNTER — OFFICE VISIT (OUTPATIENT)
Dept: FAMILY MEDICINE | Facility: CLINIC | Age: 86
End: 2021-05-21
Payer: MEDICARE

## 2021-05-21 VITALS
HEART RATE: 72 BPM | TEMPERATURE: 97.7 F | BODY MASS INDEX: 26.23 KG/M2 | WEIGHT: 177.1 LBS | SYSTOLIC BLOOD PRESSURE: 118 MMHG | RESPIRATION RATE: 16 BRPM | HEIGHT: 69 IN | DIASTOLIC BLOOD PRESSURE: 62 MMHG | OXYGEN SATURATION: 98 %

## 2021-05-21 DIAGNOSIS — R19.8 ALTERED BOWEL FUNCTION: ICD-10-CM

## 2021-05-21 DIAGNOSIS — R19.8 RECTAL PRESSURE: Primary | ICD-10-CM

## 2021-05-21 DIAGNOSIS — G25.81 RESTLESS LEGS SYNDROME: ICD-10-CM

## 2021-05-21 PROCEDURE — 99214 OFFICE O/P EST MOD 30 MIN: CPT | Performed by: PHYSICIAN ASSISTANT

## 2021-05-21 ASSESSMENT — MIFFLIN-ST. JEOR: SCORE: 1413.7

## 2021-05-21 ASSESSMENT — PAIN SCALES - GENERAL: PAINLEVEL: WORST PAIN (10)

## 2021-05-21 NOTE — PROGRESS NOTES
"    Assessment & Plan     Rectal pressure  Restless legs syndrome  Altered bowel function  Ongoing, intermittent for one month. At times seems to aggravate RLS. No loss of bowels, no bleeding of bowels. Exam does not show obvious hemorrhoid or prolapse evidence. Sitz baths and hydrocortisone mildly helpful. Hesitate to use other topicals given occasional dizziness/hypotension. We'll first try to increase miralax to complete a full clean out. If this is unsuccessful consider colorectal for anoscopy or physical therapy for biofeedback therapy for any spasming of the musculature.          BMI:   Estimated body mass index is 26.15 kg/m  as calculated from the following:    Height as of this encounter: 1.753 m (5' 9\").    Weight as of this encounter: 80.3 kg (177 lb 1.6 oz).       Return in about 2 weeks (around 6/4/2021) for send a note in 2 weeks. .    Thom Cruz PA-C  [facility] MADELAINE Villagran is a 89 year old who presents for the following health issues     HPI     Would like to discuss rectal pressure that started on April 16th continuing intermittently since then.    Correlates with episodes of BMs though constipation symptoms have improved since starting miralax  Started giving Miralax on Tuesday May 18th.  Have given every other day which has been helpful  Rectal pressure seems to start/aggravate some episodes of RLS  Feels like he is sitting on something  No loss of Bowels  No blood in the stool  RLS at times seems to improve with BMs  doing sitz baths as well - seems to help  Occasional prep H - helpful?  Sleep is still off - 2nd course of unisome seemed to upset him      Review of Systems   Constitutional, HEENT, cardiovascular, pulmonary, gi and gu systems are negative, except as otherwise noted.      Objective    /62 (BP Location: Right arm, Cuff Size: Adult Regular)   Pulse 72   Temp 97.7  F (36.5  C) (Oral)   Resp 16   Ht 1.753 m (5' 9\")   Wt 80.3 kg (177 " lb 1.6 oz)   SpO2 98%   BMI 26.15 kg/m    Body mass index is 26.15 kg/m .  Physical Exam   GENERAL: healthy, alert and no distress  RESP: lungs clear to auscultation - no rales, rhonchi or wheezes  CV: regular rates and rhythm  ABDOMEN: soft, nontender, no hepatosplenomegaly, no masses and bowel sounds normal  RECTAL (male): normal sphincter tone, no rectal masses, no significant external hemorrhoids, no evidence for prolapse

## 2021-05-25 ENCOUNTER — TRANSFERRED RECORDS (OUTPATIENT)
Dept: HEALTH INFORMATION MANAGEMENT | Facility: CLINIC | Age: 86
End: 2021-05-25

## 2021-05-26 ENCOUNTER — MYC MEDICAL ADVICE (OUTPATIENT)
Dept: FAMILY MEDICINE | Facility: CLINIC | Age: 86
End: 2021-05-26

## 2021-05-28 ENCOUNTER — OFFICE VISIT (OUTPATIENT)
Dept: URGENT CARE | Facility: URGENT CARE | Age: 86
End: 2021-05-28
Payer: MEDICARE

## 2021-05-28 ENCOUNTER — TELEPHONE (OUTPATIENT)
Dept: FAMILY MEDICINE | Facility: CLINIC | Age: 86
End: 2021-05-28

## 2021-05-28 VITALS
OXYGEN SATURATION: 97 % | HEART RATE: 66 BPM | DIASTOLIC BLOOD PRESSURE: 56 MMHG | RESPIRATION RATE: 16 BRPM | WEIGHT: 177 LBS | SYSTOLIC BLOOD PRESSURE: 120 MMHG | BODY MASS INDEX: 26.14 KG/M2

## 2021-05-28 DIAGNOSIS — M19.012 ARTHRITIS OF LEFT SHOULDER REGION: Primary | ICD-10-CM

## 2021-05-28 PROCEDURE — 99213 OFFICE O/P EST LOW 20 MIN: CPT | Performed by: FAMILY MEDICINE

## 2021-05-28 RX ORDER — HYDROCODONE BITARTRATE AND ACETAMINOPHEN 5; 325 MG/1; MG/1
1 TABLET ORAL EVERY 8 HOURS PRN
Qty: 10 TABLET | Refills: 0 | Status: SHIPPED | OUTPATIENT
Start: 2021-05-28

## 2021-05-28 NOTE — PROGRESS NOTES
Assessment & Plan     Arthritis of left shoulder region  - HYDROcodone-acetaminophen (NORCO) 5-325 MG tablet  Dispense: 10 tablet; Refill: 0  Outside records were reviewed. To help him bridge until he can get in with primary care I have given him additional Norco as this seems to help with breakthrough pain not relieved with other remedies.    PDMP was reviewed by me which shows 8 pills of hydrocodone dispensed on 5/25/21 -- no other narcotics prescribed outside of his tramadol 50mg tablets.     Gagandeep Pride MD   [facility]    Subjective     Tyshawn is a 89 year old male who presents to clinic today for the following health issues:  Chief Complaint   Patient presents with     Shoulder     pain would like refill before the weekend      HPI    Patient has a scheduled ablation for this left shoulder again to be done in about 3 weeks from now.  Since his visit 2 days ago at the urgency room he has not had any new injuries or falls.  In the emergency room 2 days ago they did an x-ray which showed no fracture for pain they gave him Decadron in addition to Norco.  At baseline he takes tramadol scheduled for his chronic pain.  His daughter accompanies him today and also expresses that he has encountered sleeping difficulties with taking the Decadron, he takes this twice daily as prescribed.   Taking norco approximately 1-2 times a day, they have about 8-10 pills left and are worried they will not have any before they can get in to be seen.     Receiving decadron and hydrocodone/tylenol, topical diclofenac  He has had a L supraclavicular nerve block and [facility]  Patient Active Problem List    Diagnosis Date Noted     Basal cell carcinoma 08/11/2017     Priority: Medium     Left upper back       Abdominal aortic aneurysm (AAA) without rupture (H) 07/19/2017     Priority: Medium     Thoracic: 4cm, Abdominal 5x4.7cm       Benign prostatic hyperplasia with nocturia 07/14/2017      Priority: Medium     Mar 29, 2006  Entered By: MARYANN MONTES  Comment: 3/06 cysto with BPH, bladder diverticulum and trabeculation       History of cataract extraction 07/14/2017     Priority: Medium     No comments entered for problem.       Gastroesophageal reflux disease 07/14/2017     Priority: Medium     No comments entered for problem.       Hearing loss 07/14/2017     Priority: Medium     Sensorineuroal R>L; [facility]       Pure hypercholesterolemia 07/14/2017     Priority: Medium     No comments entered for problem.       Insomnia 07/14/2017     Priority: Medium     Oct 03, 2012  Entered By: KEY BARBA  Comment: 8/2011, sleep medicine, d/t restless legs       Age-related macular degeneration 07/14/2017     Priority: Medium     No comments entered for problem.       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       SCC (squamous cell carcinoma), arm, right 07/14/2017     Priority: Medium       Current Outpatient Medications   Medication     HYDROcodone-acetaminophen (NORCO) 5-325 MG tablet     albuterol (PROAIR HFA/PROVENTIL HFA/VENTOLIN HFA) 108 (90 BASE) MCG/ACT Inhaler     alfuzosin (UROXATRAL) 10 MG 24 hr tablet     benzonatate (TESSALON) 100 MG capsule     Cholecalciferol (VITAMIN D-3) 125 MCG (5000 UT) TABS     clotrimazole (MYCELEX) 10 MG lozenge     diclofenac (VOLTAREN) 1 % GEL topical gel     fluticasone-salmeterol (ADVAIR DISKUS) 250-50 MCG/DOSE inhaler     lidocaine patch in PLACE     MAGNESIUM GLYCINATE PLUS PO     methocarbamol (ROBAXIN) 500 MG tablet     metroNIDAZOLE (METROGEL) 0.75 % topical gel     Multiple Vitamins-Minerals (PRESERVISION AREDS) CAPS     Multiple Vitamins-Minerals (ZINC PO)      Omega-3 Fatty Acids (FISH OIL PO)     Polyethylene Glycol 3350 (SMOOTH LAX PO)     pramipexole (MIRAPEX) 0.125 MG tablet     saw palmetto 450 MG CAPS capsule     traMADol (ULTRAM) 50 MG tablet     vitamin B complex with vitamin C (VITAMIN  B COMPLEX) tablet     Vitamin Mixture (VITAMIN C) LIQD     No current facility-administered medications for this visit.          Objective    /56 (BP Location: Right arm, Patient Position: Chair, Cuff Size: Adult Large)   Pulse 66   Resp 16   Wt 80.3 kg (177 lb)   SpO2 97%   BMI 26.14 kg/m    Physical Exam     L shoulder/arm : rests in a sling  GEN: comfortable, wheelchair bound    No results found for any visits on 05/28/21.            The use of Dragon/[employer] dictation services may have been used to construct the content in this note; any grammatical or spelling errors are non-intentional. Please contact the author of this note directly if you are in need of any clarification.

## 2021-05-28 NOTE — PATIENT INSTRUCTIONS
Take the decadron steroid in the mornings (both doses)       Call for appointment with your Doctor's office next week

## 2021-05-28 NOTE — TELEPHONE ENCOUNTER
Giovanna calling. Dad is in pain since last night. He felt the shoulder pain. He has been shaking all over. [facility] and has that scheduled on the June 24th at 1:00 pm. But he is just miserable.     The Hydrocodone has not really been helpful. Adv for patient to be seen due to how much pain he is in. Daughter agreeable to plan and preferred urgent care.     Sharri Manzo RN on 5/28/2021 at 12:16 PM

## 2021-05-31 ENCOUNTER — NURSE TRIAGE (OUTPATIENT)
Dept: NURSING | Facility: CLINIC | Age: 86
End: 2021-05-31

## 2021-05-31 NOTE — TELEPHONE ENCOUNTER
Continuation for several weeks, constipated using Smooth Lax. What happens is the Smooth lax is soft stools and irritation of rectum. They do sitz baths, and it triggers his restless leg. He's had three stools for 22 inches. The rectal pressure doesn't relieve. He's been standing all night to stop the restless leg. He's been on pain med for severe shoulder pain the med didn't help him fall asleep. They did another sitz bath. Tried sitting on an ice pack. He's anemic. He stands up and that adds pressure in the rectum. Has a virtual visit tomorrow. Don't know what to do for him today. He's going to have an anal scope. It's been a couple of months of this.  Since they have done everything they've been instructed to do, his daughter is willing to do a virtual visit since she said the urgent care would tell them nothing different today, if they were to bring him there. I connected with scheduling for a virtual visit today to discuss options for today.   Gaby Rodriguez RN  [facility]  rec    Reason for Disposition    [1] Follow-up call to recent contact AND [2] information only call, no triage required    Additional Information    Negative: [1] Caller is not with the adult (patient) AND [2] reporting urgent symptoms    Negative: Lab result questions    Negative: Medication questions    Negative: Caller can't be reached by phone    Negative: Caller has already spoken to PCP or another triager    Negative: RN needs further essential information from caller in order to complete triage    Negative: Requesting regular office appointment    Negative: [1] Caller requesting NON-URGENT health information AND [2] PCP's office is the best resource    Negative: Health Information question, no triage required and triager able to answer question    Negative: General information question, no triage required and triager able to answer question    Negative: Question about upcoming scheduled test, no triage required and  triager able to answer question    Negative: [1] Caller is not with the adult (patient) AND [2] probable NON-URGENT symptoms    Protocols used: INFORMATION ONLY CALL - NO TRIAGE-A-AH

## 2021-06-01 ENCOUNTER — VIRTUAL VISIT (OUTPATIENT)
Dept: FAMILY MEDICINE | Facility: CLINIC | Age: 86
End: 2021-06-01
Payer: MEDICARE

## 2021-06-01 ENCOUNTER — TRANSFERRED RECORDS (OUTPATIENT)
Dept: HEALTH INFORMATION MANAGEMENT | Facility: CLINIC | Age: 86
End: 2021-06-01

## 2021-06-01 ENCOUNTER — MYC MEDICAL ADVICE (OUTPATIENT)
Dept: FAMILY MEDICINE | Facility: CLINIC | Age: 86
End: 2021-06-01

## 2021-06-01 DIAGNOSIS — M25.512 LEFT SHOULDER PAIN, UNSPECIFIED CHRONICITY: Primary | ICD-10-CM

## 2021-06-01 DIAGNOSIS — G25.81 RESTLESS LEGS SYNDROME: ICD-10-CM

## 2021-06-01 DIAGNOSIS — R19.8 RECTAL PRESSURE: ICD-10-CM

## 2021-06-01 DIAGNOSIS — R19.8 ALTERED BOWEL FUNCTION: ICD-10-CM

## 2021-06-01 PROCEDURE — 99215 OFFICE O/P EST HI 40 MIN: CPT | Mod: 95 | Performed by: PHYSICIAN ASSISTANT

## 2021-06-01 NOTE — PROGRESS NOTES
Tyshawn is a 89 year old who is being evaluated via a billable video visit.      How would you like to obtain your AVS? MyChart  If the video visit is dropped, the invitation should be resent by: Text to cell phone: [phone]  Will anyone else be joining your video visit? No      Video Start Time: 5:47 PM    Assessment & Plan     Left shoulder pain, unspecified chronicity  Rectal pressure  Altered bowel function  Restless legs syndrome  Tyshawn continues with a troubling (now triad) group of symptoms that have really upended his quality of life. PReviously dealing with just altered bowel function/rectal pressure and RLS, now has had some recurrent left shoulder pain x 2 weeks. Seen urgent care x 2 for this, negative xray. [facility] but not until 6/24. He has been given some norco at his  visits and patient, spouse and daughter all think this has really helped some nights (in addition to the tramadol) but potentially may have caused the bowels to take a step backward. We are treading difficult water. Katy age has be worried about so much opiate on board and his daughter spouse and him acknowledge this. Still they prefer to continue forward with the risks given the benefit he's received. They are also aware this can affect the bowels (and in turn his RLS historically) so we discussed at length continuing an excellent bowel regimen. They are setting up an appt with [facility] but I think additionally we'll need to consider looking at the spine to confirm there is not something else worsening the RLS. We discussed at length the risks of the narcotics and again they were acknowledged and accepted by family. HOpefully though getting Tyshawn some symptom free days and nights will possibly break this cycle. They will contact me for norco refills when needed. Hopefullyu using just one nightly but can increase to 2 prn.    This video visit was 56 minutes long not  "including chart review and chart note completion.      BMI:   Estimated body mass index is 26.14 kg/m  as calculated from the following:    Height as of 5/21/21: 1.753 m (5' 9\").    Weight as of 5/28/21: 80.3 kg (177 lb).     Return in about 4 weeks (around 6/29/2021) for recheck if symptoms are not improving..    [facility] MADELAINE Villagran is a 89 year old who presents for the following health issues  accompanied by his spouse and daughter:    [facility]     Patient/spouse/daughter calling to discuss ongoing symtoms  Specifically today hoping to discuss pain control for shoulder prior to pain clinic [facility] on [facility]  Has had this procedure done previously, but fine since fall 2020  He has seen urgent care x2 since restarted; given additional opioids in addition to current tramadol  Got 3 nights of excellent sleep     rectal pressure continues - possibly after starting the above as they actually had had some better days with the regimen of miralax/fiber we'd started,   Some BMs that seem larger; still seem to exacerbate the RLS  Feeling \"like sitting on something\" at times  Doing pelvic floor exercises    RLS seemed improved on some of nights with Norco  Seems to tolerate norco well other than possible irritation of bowel?      Review of Systems   Constitutional, HEENT, cardiovascular, pulmonary, gi and gu systems are negative, except as otherwise noted.      Objective           Vitals:  No vitals were obtained today due to virtual visit.    Physical Exam   GENERAL: alert  RESP: No audible wheeze, cough, or visible cyanosis.  No visible retractions or increased work of breathing.    SKIN: Visible skin clear. No significant rash, abnormal pigmentation or lesions.  NEURO: Cranial nerves grossly intact.  Mentation and speech appropriate for age.  PSYCH: Mentation appears normal, affect normal/bright, judgement and insight intact, normal speech and appearance " well-gromonica.          Video-Visit Details    Type of service:  Video Visit    Video End Time: 643    Originating Location (pt. Location): Home    Distant Location (provider location):  [facility]     Platform used for Video Visit: ICONIC

## 2021-06-02 ENCOUNTER — TELEPHONE (OUTPATIENT)
Dept: FAMILY MEDICINE | Facility: CLINIC | Age: 86
End: 2021-06-02

## 2021-06-02 ENCOUNTER — MYC MEDICAL ADVICE (OUTPATIENT)
Dept: FAMILY MEDICINE | Facility: CLINIC | Age: 86
End: 2021-06-02

## 2021-06-02 NOTE — TELEPHONE ENCOUNTER
Daughter calls regarding message below.    Daughter states patient had Miralax this morning, and was able to pass one bowel movement this morning. After bowel movement, rectal pressure began again, and patient's restless legs started to act up. Daughter states patient has been standing up since 10 am this morning. Susan is wondering what the next steps are.     Susan is [facility] the [facility] regarding [facility].     Call Susan back at [phone]

## 2021-06-02 NOTE — TELEPHONE ENCOUNTER
Spoke with Susan, states that she sent an updated message in [employer] (separate encounter).

## 2021-06-02 NOTE — TELEPHONE ENCOUNTER
I am thinking at this point they could try a norco to help with symptoms today. Long term we may need to consider trying to add another medication - something like duloxetine or amitriptyline to help with nerve like pain? We can first see what their [facility]

## 2021-07-27 ENCOUNTER — LAB (OUTPATIENT)
Dept: LAB | Facility: CLINIC | Age: 86
End: 2021-07-27
Payer: MEDICARE

## 2021-07-27 DIAGNOSIS — D64.9 ANEMIA, UNSPECIFIED TYPE: ICD-10-CM

## 2021-07-27 LAB
BASOPHILS # BLD AUTO: 0 10E3/UL (ref 0–0.2)
BASOPHILS NFR BLD AUTO: 0 %
EOSINOPHIL # BLD AUTO: 0.1 10E3/UL (ref 0–0.7)
EOSINOPHIL NFR BLD AUTO: 1 %
ERYTHROCYTE [DISTWIDTH] IN BLOOD BY AUTOMATED COUNT: 15.2 % (ref 10–15)
FERRITIN SERPL-MCNC: 146 NG/ML (ref 26–388)
HCT VFR BLD AUTO: 30.9 % (ref 40–53)
HGB BLD-MCNC: 10 G/DL (ref 13.3–17.7)
IRON SATN MFR SERPL: 19 % (ref 15–46)
IRON SERPL-MCNC: 44 UG/DL (ref 35–180)
LYMPHOCYTES # BLD AUTO: 1.5 10E3/UL (ref 0.8–5.3)
LYMPHOCYTES NFR BLD AUTO: 25 %
MCH RBC QN AUTO: 30.2 PG (ref 26.5–33)
MCHC RBC AUTO-ENTMCNC: 32.4 G/DL (ref 31.5–36.5)
MCV RBC AUTO: 93 FL (ref 78–100)
MONOCYTES # BLD AUTO: 0.8 10E3/UL (ref 0–1.3)
MONOCYTES NFR BLD AUTO: 12 %
NEUTROPHILS # BLD AUTO: 3.9 10E3/UL (ref 1.6–8.3)
NEUTROPHILS NFR BLD AUTO: 62 %
PLATELET # BLD AUTO: 198 10E3/UL (ref 150–450)
RBC # BLD AUTO: 3.31 10E6/UL (ref 4.4–5.9)
TIBC SERPL-MCNC: 228 UG/DL (ref 240–430)
WBC # BLD AUTO: 6.3 10E3/UL (ref 4–11)

## 2021-07-27 PROCEDURE — 36415 COLL VENOUS BLD VENIPUNCTURE: CPT

## 2021-07-27 PROCEDURE — 85025 COMPLETE CBC W/AUTO DIFF WBC: CPT

## 2021-07-27 PROCEDURE — 83550 IRON BINDING TEST: CPT

## 2021-07-27 PROCEDURE — 82728 ASSAY OF FERRITIN: CPT

## 2021-08-12 ENCOUNTER — MYC MEDICAL ADVICE (OUTPATIENT)
Dept: FAMILY MEDICINE | Facility: CLINIC | Age: 86
End: 2021-08-12

## 2021-08-18 ENCOUNTER — DOCUMENTATION ONLY (OUTPATIENT)
Dept: OTHER | Facility: CLINIC | Age: 86
End: 2021-08-18

## 2021-10-09 ENCOUNTER — HEALTH MAINTENANCE LETTER (OUTPATIENT)
Age: 86
End: 2021-10-09

## 2021-11-12 ENCOUNTER — MYC MEDICAL ADVICE (OUTPATIENT)
Dept: FAMILY MEDICINE | Facility: CLINIC | Age: 86
End: 2021-11-12
Payer: MEDICARE

## 2021-11-12 DIAGNOSIS — B37.0 THRUSH: ICD-10-CM

## 2021-11-12 RX ORDER — CLOTRIMAZOLE 10 MG/1
10 LOZENGE ORAL
Qty: 70 LOZENGE | Refills: 0 | Status: SHIPPED | OUTPATIENT
Start: 2021-11-12 | End: 2022-01-17

## 2021-12-04 ENCOUNTER — OFFICE VISIT (OUTPATIENT)
Dept: URGENT CARE | Facility: URGENT CARE | Age: 86
End: 2021-12-04
Payer: MEDICARE

## 2021-12-04 VITALS
TEMPERATURE: 98.1 F | SYSTOLIC BLOOD PRESSURE: 131 MMHG | WEIGHT: 172 LBS | BODY MASS INDEX: 25.4 KG/M2 | OXYGEN SATURATION: 98 % | HEART RATE: 81 BPM | RESPIRATION RATE: 16 BRPM | DIASTOLIC BLOOD PRESSURE: 68 MMHG

## 2021-12-04 DIAGNOSIS — K59.00 CONSTIPATION, UNSPECIFIED CONSTIPATION TYPE: Primary | ICD-10-CM

## 2021-12-04 PROCEDURE — 99214 OFFICE O/P EST MOD 30 MIN: CPT | Performed by: PHYSICIAN ASSISTANT

## 2021-12-04 RX ORDER — LACTULOSE 10 G/10G
20 SOLUTION ORAL AT BEDTIME
Qty: 60 PACKET | Refills: 0 | Status: SHIPPED | OUTPATIENT
Start: 2021-12-04 | End: 2022-01-03

## 2021-12-04 NOTE — PATIENT INSTRUCTIONS
Patient Education     Constipation (Adult)  Constipation means that you have bowel movements that are less frequent than usual. Stools often become very hard and difficult to pass.  Constipation is very common. At some point in life, it affects almost everyone. Since everyone's bowel habits are different, what is constipation to one person may not be to another. Your healthcare provider may do tests to diagnose constipation. It depends on what he or she finds when evaluating you.    Symptoms of constipation include:    Abdominal pain    Bloating    Vomiting    Painful bowel movements    Itching, swelling, bleeding, or pain around the anus  Causes  Constipation can have many causes. These include:    Diet low in fiber    Too much dairy    Not drinking enough liquids    Lack of exercise or physical activity (especially true for older adults)    Changes in lifestyle or daily routine, including pregnancy, aging, work, and travel    Frequent use or misuse of laxatives    Ignoring the urge to have a bowel movement or delaying it until later    Medicines, such as certain prescription pain medicines, iron supplements, antacids, certain antidepressants, and calcium supplements    Diseases like irritable bowel syndrome, bowel obstructions, stroke, diabetes, thyroid disease, Parkinson disease, hemorrhoids, and colon cancer  Complications  Potential complications of constipation can include:    Hemorrhoids    Rectal bleeding from hemorrhoids or anal fissures (skin tears)    Hernias    Dependency on laxatives    Chronic constipation    Fecal impaction, a severe form of constipation in which a large amount of hard stool is in your rectum that you can't pass    Bowel obstruction or perforation  Home care  All treatment should be done after talking with your healthcare provider. This is especially true if you have another medical problems, are taking prescription medicines, or are an older adult. Treatment most often involves  lifestyle changes. You may also need medicines. Your healthcare provider will tell you which will work best for you. Follow the advice below to help avoid this problem in the future.  Lifestyle changes  These lifestyle changes can help prevent constipation:    Diet. Eat a high-fiber diet, with fresh fruit and vegetables, and reduce dairy intake, meats, and processed foods    Fluids. It's important to get enough fluids each day. Drink plenty of water when you eat more fiber. If you are on diet that limits the amount of fluid you can have, talk about this with your healthcare provider.    Regular exercise. Check with your healthcare provider first.  Medicines  Take any medicines as directed. Some laxatives are safe to use only every now and then. Others can be taken on a regular basis. While laxatives don't cause bowel dependence, they are treating the symptoms. So your constipation may return if you don't make other changes. Talk with your healthcare provider or pharmacist if you have questions.  Prescription pain medicines can cause constipation. If you are taking this kind of medicine, ask your healthcare provider if you should also take a stool softener.  Medicines you may take to treat constipation include:    Fiber supplements    Stool softeners    Laxatives    Enemas    Rectal suppositories  Follow-up care  Follow up with your healthcare provider if symptoms don't get better in the next few days. You may need to have more tests or see a specialist.  Call 911  Call 911 if any of these occur:    Trouble breathing    Stiff, rigid abdomen that is severely painful to touch    Confusion    Fainting or loss of consciousness    Rapid heart rate    Chest pain  When to seek medical advice  Call your healthcare provider right away if any of these occur:    Fever of 100.4 F (38 C) or higher, or as directed by your healthcare provider    Failure to resume normal bowel movements    Pain in your abdomen or back gets  worse    Nausea or vomiting    Swelling in your abdomen    Blood in the stool    Black, tarry stool    Involuntary weight loss    Weakness  Phillip last reviewed this educational content on 6/1/2018 2000-2021 [employer]. All rights reserved. This information is not intended as a substitute for professional medical care. Always follow your healthcare professional's instructions.

## 2021-12-04 NOTE — PROGRESS NOTES
Constipation, unspecified constipation type  - naloxegol (MOVANTIK) 12.5 MG TABS tablet; Take 1 tablet (12.5 mg) by mouth every morning (before breakfast)  - magnesium citrate solution; Take 296 mLs by mouth once for 1 dose  - lactulose (CEPHULAC) 10 GM packet; Take 2 packets (20 g) by mouth At Bedtime    30 minutes spent on the date of the encounter doing chart review, history and exam, documentation and further activities per the note.     See Patient Instructions  Patient Instructions     Patient Education     Constipation (Adult)  Constipation means that you have bowel movements that are less frequent than usual. Stools often become very hard and difficult to pass.  Constipation is very common. At some point in life, it affects almost everyone. Since everyone's bowel habits are different, what is constipation to one person may not be to another. Your healthcare provider may do tests to diagnose constipation. It depends on what he or she finds when evaluating you.    Symptoms of constipation include:    Abdominal pain    Bloating    Vomiting    Painful bowel movements    Itching, swelling, bleeding, or pain around the anus  Causes  Constipation can have many causes. These include:    Diet low in fiber    Too much dairy    Not drinking enough liquids    Lack of exercise or physical activity (especially true for older adults)    Changes in lifestyle or daily routine, including pregnancy, aging, work, and travel    Frequent use or misuse of laxatives    Ignoring the urge to have a bowel movement or delaying it until later    Medicines, such as certain prescription pain medicines, iron supplements, antacids, certain antidepressants, and calcium supplements    Diseases like irritable bowel syndrome, bowel obstructions, stroke, diabetes, thyroid disease, Parkinson disease, hemorrhoids, and colon cancer  Complications  Potential complications of constipation can include:    Hemorrhoids    Rectal bleeding from  hemorrhoids or anal fissures (skin tears)    Hernias    Dependency on laxatives    Chronic constipation    Fecal impaction, a severe form of constipation in which a large amount of hard stool is in your rectum that you can't pass    Bowel obstruction or perforation  Home care  All treatment should be done after talking with your healthcare provider. This is especially true if you have another medical problems, are taking prescription medicines, or are an older adult. Treatment most often involves lifestyle changes. You may also need medicines. Your healthcare provider will tell you which will work best for you. Follow the advice below to help avoid this problem in the future.  Lifestyle changes  These lifestyle changes can help prevent constipation:    Diet. Eat a high-fiber diet, with fresh fruit and vegetables, and reduce dairy intake, meats, and processed foods    Fluids. It's important to get enough fluids each day. Drink plenty of water when you eat more fiber. If you are on diet that limits the amount of fluid you can have, talk about this with your healthcare provider.    Regular exercise. Check with your healthcare provider first.  Medicines  Take any medicines as directed. Some laxatives are safe to use only every now and then. Others can be taken on a regular basis. While laxatives don't cause bowel dependence, they are treating the symptoms. So your constipation may return if you don't make other changes. Talk with your healthcare provider or pharmacist if you have questions.  Prescription pain medicines can cause constipation. If you are taking this kind of medicine, ask your healthcare provider if you should also take a stool softener.  Medicines you may take to treat constipation include:    Fiber supplements    Stool softeners    Laxatives    Enemas    Rectal suppositories  Follow-up care  Follow up with your healthcare provider if symptoms don't get better in the next few days. You may need to have  more tests or see a specialist.  Call 911  Call 911 if any of these occur:    Trouble breathing    Stiff, rigid abdomen that is severely painful to touch    Confusion    Fainting or loss of consciousness    Rapid heart rate    Chest pain  When to seek medical advice  Call your healthcare provider right away if any of these occur:    Fever of 100.4 F (38 C) or higher, or as directed by your healthcare provider    Failure to resume normal bowel movements    Pain in your abdomen or back gets worse    Nausea or vomiting    Swelling in your abdomen    Blood in the stool    Black, tarry stool    Involuntary weight loss    Weakness  [employer] last reviewed this educational content on 6/1/2018 2000-2021 [employer]. All rights reserved. This information is not intended as a substitute for professional medical care. Always follow your healthcare professional's instructions.               Huseyin Mckeon PA-C  [facility]    Subjective   89 year old who presents to clinic today for the following health issues:    Constipation       HPI     Constipation  Onset/Duration: On and off since October. Patient was taking Miralax for his opioid induced constipation. This was making his restless leg worse. Patient was switched to senna-s which has been giving him hard small stools for the last couple of weeks   Description:  Frequency of bowel movements: 2-3 times a day but in small amount   Consistency of stool: Hard small pellets   Progression of Symptoms: worsening  Accompanying signs and symptoms:    Abdominal pain: no   Rectal pain: no   Blood in stool: no   Nausea/Vomiting: no   Weight loss or gain: no  History:   Similar problems in past: no  History of abdominal surgery: no  Chronic laxative use: YES  New medications: no  Precipitating or alleviating factors: None   Therapies tried and outcome: None      Review of Systems   Review of Systems   See HPI     Objective    Temp: 98.1  F (36.7  C)  Temp src: Oral BP: 131/68 Pulse: 81   Resp: 16 SpO2: 98 %       Physical Exam   Physical Exam  Constitutional:       General: He is not in acute distress.     Appearance: Normal appearance. He is normal weight. He is not ill-appearing, toxic-appearing or diaphoretic.   HENT:      Head: Normocephalic and atraumatic.   Cardiovascular:      Rate and Rhythm: Normal rate and regular rhythm.      Pulses: Normal pulses.      Heart sounds: Normal heart sounds. No murmur heard.  No friction rub. No gallop.    Pulmonary:      Effort: Pulmonary effort is normal. No respiratory distress.      Breath sounds: Normal breath sounds. No stridor. No wheezing, rhonchi or rales.   Chest:      Chest wall: No tenderness.   Abdominal:      General: Abdomen is flat. Bowel sounds are decreased. There is no distension.      Palpations: Abdomen is soft. There is no mass.      Tenderness: There is no abdominal tenderness. There is no right CVA tenderness, left CVA tenderness, guarding or rebound.      Hernia: No hernia is present.   Neurological:      General: No focal deficit present.      Mental Status: He is alert and oriented to person, place, and time. Mental status is at baseline.      Gait: Gait normal.   Psychiatric:         Mood and Affect: Mood normal.         Behavior: Behavior normal.         Thought Content: Thought content normal.         Judgment: Judgment normal.          No results found for this or any previous visit (from the past 24 hour(s)).

## 2021-12-26 ENCOUNTER — APPOINTMENT (OUTPATIENT)
Dept: GENERAL RADIOLOGY | Facility: CLINIC | Age: 86
End: 2021-12-26
Attending: EMERGENCY MEDICINE
Payer: MEDICARE

## 2021-12-26 ENCOUNTER — HOSPITAL ENCOUNTER (EMERGENCY)
Facility: CLINIC | Age: 86
Discharge: HOME | End: 2021-12-26
Attending: EMERGENCY MEDICINE | Admitting: EMERGENCY MEDICINE
Payer: MEDICARE

## 2021-12-26 ENCOUNTER — MYC MEDICAL ADVICE (OUTPATIENT)
Dept: FAMILY MEDICINE | Facility: CLINIC | Age: 86
End: 2021-12-26

## 2021-12-26 ENCOUNTER — APPOINTMENT (OUTPATIENT)
Dept: CT IMAGING | Facility: CLINIC | Age: 86
End: 2021-12-26
Attending: EMERGENCY MEDICINE
Payer: MEDICARE

## 2021-12-26 VITALS
HEIGHT: 70 IN | SYSTOLIC BLOOD PRESSURE: 163 MMHG | HEART RATE: 84 BPM | WEIGHT: 172 LBS | DIASTOLIC BLOOD PRESSURE: 82 MMHG | RESPIRATION RATE: 20 BRPM | BODY MASS INDEX: 24.62 KG/M2 | TEMPERATURE: 98.2 F | OXYGEN SATURATION: 96 %

## 2021-12-26 DIAGNOSIS — S00.93XA CONTUSION OF HEAD, UNSPECIFIED PART OF HEAD, INITIAL ENCOUNTER: ICD-10-CM

## 2021-12-26 DIAGNOSIS — S22.32XA CLOSED FRACTURE OF ONE RIB OF LEFT SIDE, INITIAL ENCOUNTER: ICD-10-CM

## 2021-12-26 DIAGNOSIS — S42.209A CLOSED FRACTURE OF PROXIMAL END OF HUMERUS, UNSPECIFIED FRACTURE MORPHOLOGY, UNSPECIFIED LATERALITY, INITIAL ENCOUNTER: ICD-10-CM

## 2021-12-26 LAB
ANION GAP SERPL CALCULATED.3IONS-SCNC: 5 MMOL/L (ref 3–14)
BASOPHILS # BLD AUTO: 0 10E3/UL (ref 0–0.2)
BASOPHILS NFR BLD AUTO: 0 %
BUN SERPL-MCNC: 31 MG/DL (ref 7–30)
CALCIUM SERPL-MCNC: 9.1 MG/DL (ref 8.5–10.1)
CHLORIDE BLD-SCNC: 105 MMOL/L (ref 94–109)
CO2 SERPL-SCNC: 24 MMOL/L (ref 20–32)
CREAT SERPL-MCNC: 0.97 MG/DL (ref 0.66–1.25)
EOSINOPHIL # BLD AUTO: 0 10E3/UL (ref 0–0.7)
EOSINOPHIL NFR BLD AUTO: 0 %
ERYTHROCYTE [DISTWIDTH] IN BLOOD BY AUTOMATED COUNT: 15.4 % (ref 10–15)
GFR SERPL CREATININE-BSD FRML MDRD: 70 ML/MIN/1.73M2
GLUCOSE BLD-MCNC: 109 MG/DL (ref 70–99)
HCT VFR BLD AUTO: 31.8 % (ref 40–53)
HGB BLD-MCNC: 10.2 G/DL (ref 13.3–17.7)
IMM GRANULOCYTES # BLD: 0 10E3/UL
IMM GRANULOCYTES NFR BLD: 0 %
LYMPHOCYTES # BLD AUTO: 1.3 10E3/UL (ref 0.8–5.3)
LYMPHOCYTES NFR BLD AUTO: 13 %
MCH RBC QN AUTO: 30.5 PG (ref 26.5–33)
MCHC RBC AUTO-ENTMCNC: 32.1 G/DL (ref 31.5–36.5)
MCV RBC AUTO: 95 FL (ref 78–100)
MONOCYTES # BLD AUTO: 0.9 10E3/UL (ref 0–1.3)
MONOCYTES NFR BLD AUTO: 9 %
NEUTROPHILS # BLD AUTO: 7.8 10E3/UL (ref 1.6–8.3)
NEUTROPHILS NFR BLD AUTO: 78 %
NRBC # BLD AUTO: 0 10E3/UL
NRBC BLD AUTO-RTO: 0 /100
PLATELET # BLD AUTO: 163 10E3/UL (ref 150–450)
POTASSIUM BLD-SCNC: 4.6 MMOL/L (ref 3.4–5.3)
RBC # BLD AUTO: 3.34 10E6/UL (ref 4.4–5.9)
SODIUM SERPL-SCNC: 134 MMOL/L (ref 133–144)
WBC # BLD AUTO: 10.1 10E3/UL (ref 4–11)

## 2021-12-26 PROCEDURE — 250N000013 HC RX MED GY IP 250 OP 250 PS 637: Performed by: EMERGENCY MEDICINE

## 2021-12-26 PROCEDURE — 73090 X-RAY EXAM OF FOREARM: CPT | Mod: LT

## 2021-12-26 PROCEDURE — 250N000011 HC RX IP 250 OP 636: Performed by: EMERGENCY MEDICINE

## 2021-12-26 PROCEDURE — 250N000009 HC RX 250: Performed by: EMERGENCY MEDICINE

## 2021-12-26 PROCEDURE — 99285 EMERGENCY DEPT VISIT HI MDM: CPT | Mod: 25

## 2021-12-26 PROCEDURE — 80048 BASIC METABOLIC PNL TOTAL CA: CPT | Performed by: EMERGENCY MEDICINE

## 2021-12-26 PROCEDURE — 90714 TD VACC NO PRESV 7 YRS+ IM: CPT | Performed by: EMERGENCY MEDICINE

## 2021-12-26 PROCEDURE — 73020 X-RAY EXAM OF SHOULDER: CPT | Mod: LT

## 2021-12-26 PROCEDURE — 85025 COMPLETE CBC W/AUTO DIFF WBC: CPT | Performed by: EMERGENCY MEDICINE

## 2021-12-26 PROCEDURE — 71260 CT THORAX DX C+: CPT | Mod: MG

## 2021-12-26 PROCEDURE — 23600 CLTX PROX HUMRL FX W/O MNPJ: CPT | Mod: LT

## 2021-12-26 PROCEDURE — 72125 CT NECK SPINE W/O DYE: CPT | Mod: MG

## 2021-12-26 PROCEDURE — 90471 IMMUNIZATION ADMIN: CPT | Performed by: EMERGENCY MEDICINE

## 2021-12-26 PROCEDURE — 36415 COLL VENOUS BLD VENIPUNCTURE: CPT | Performed by: EMERGENCY MEDICINE

## 2021-12-26 PROCEDURE — 73060 X-RAY EXAM OF HUMERUS: CPT | Mod: LT

## 2021-12-26 PROCEDURE — 70450 CT HEAD/BRAIN W/O DYE: CPT | Mod: MG

## 2021-12-26 RX ORDER — ACETAMINOPHEN 325 MG/1
975 TABLET ORAL ONCE
Status: COMPLETED | OUTPATIENT
Start: 2021-12-26 | End: 2021-12-26

## 2021-12-26 RX ORDER — IOPAMIDOL 755 MG/ML
86 INJECTION, SOLUTION INTRAVASCULAR ONCE
Status: COMPLETED | OUTPATIENT
Start: 2021-12-26 | End: 2021-12-26

## 2021-12-26 RX ADMIN — IOPAMIDOL 86 ML: 755 INJECTION, SOLUTION INTRAVENOUS at 10:05

## 2021-12-26 RX ADMIN — CLOSTRIDIUM TETANI TOXOID ANTIGEN (FORMALDEHYDE INACTIVATED) AND CORYNEBACTERIUM DIPHTHERIAE TOXOID ANTIGEN (FORMALDEHYDE INACTIVATED) 0.5 ML: 5; 2 INJECTION, SUSPENSION INTRAMUSCULAR at 07:05

## 2021-12-26 RX ADMIN — ACETAMINOPHEN 975 MG: 325 TABLET, FILM COATED ORAL at 05:54

## 2021-12-26 RX ADMIN — SODIUM CHLORIDE 65 ML: 900 INJECTION INTRAVENOUS at 10:06

## 2021-12-26 ASSESSMENT — MIFFLIN-ST. JEOR: SCORE: 1393.5

## 2021-12-26 ASSESSMENT — ENCOUNTER SYMPTOMS
ARTHRALGIAS: 1
MYALGIAS: 1
NECK PAIN: 1

## 2021-12-26 NOTE — ED NOTES
Patient is awake, alert and oriented to person, place, time and situation. Patient is very hard of hearing.    Patient airway is patent.    Respirations are regular and unlabored.  Patient talking in full sentences.  Patient denies cough or shortness of breath.    Pulses are strong and regular with palpation.  Skin is pale in color, warm and dry.   Cap refill is less than 3 seconds.  Patient denies chest pain/pressure.    Patient reports he had sat on toilet and after using the facilities he fell asleep and then fell off the toilet onto the floor. Patient has a contusion to the left side of his head. Patient has a skin tear to his left forearm. Bleeding controlled PTA. CMS intact. Patient has let should and left humerus pain. Patient's left arm is in temporary fabric sling from EMS. Patient denies pain in his neck or back.     Patient is able to reposition on cart with assistance.

## 2021-12-26 NOTE — ED NOTES
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 Feels comfortable using IS at home.

## 2021-12-26 NOTE — ED NOTES
Bed: ED25  Expected date: 12/26/21  Expected time: 5:10 AM  Means of arrival: Ambulance  Comments:  [facility] 89M fall of toilet; forehead bump

## 2021-12-26 NOTE — ED PROVIDER NOTES
"  History   Chief Complaint:  Fall       HPI   Eddie Stephens is a 89 year old male with history of osteoarthritis who presents via EMS after a fall. The patient states that he was seated on the toilet when he fell asleep and fell off onto his left side. He states having left arm pain, neck pain, and shoulder pain. He also mentions that he hit his head and sustained a bruise. The patient states that this has happened to him in the past. EMS mentions that the patient has left greater than right leg edema at baseline. Denies any loss of consciousness.     Review of Systems   Musculoskeletal: Positive for arthralgias (left shoulder), myalgias (left arm) and neck pain.   Neurological: Negative for syncope.   All other systems reviewed and are negative.        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  Atrial enlargement, bilateral   Basal cell carcinoma    Osteoarthritis   Depression     Past Surgical History:    MOHS surgery    Family History:    Coronary artery disease  Hypertension    Social History:  Patient presents alone via EMS  Patient lives with his wife      Physical Exam     Patient Vitals for the past 24 hrs:   BP Temp Temp src Pulse Resp SpO2 Height Weight   12/26/21 0730 -- -- -- -- -- 96 % -- --   12/26/21 0711 (!) 163/82 -- -- 84 -- 96 % -- --   12/26/21 0700 -- -- -- -- -- 93 % -- --   12/26/21 0645 (!) 156/80 -- -- 85 -- 94 % -- --   12/26/21 0526 (!) 171/92 -- -- 79 20 97 % 1.765 m (5' 9.5\") " 78 kg (172 lb)   12/26/21 0525 -- 98.2  F (36.8  C) Temporal -- 22 -- -- --       Physical Exam  Constitutional:  Oriented to person, place, and time. Well appearing  HENT:    Ear TMs clear  Head:    Contusion to left frontal temporal forehead. Normocephalic.   Mouth/Throat:   Oropharynx is clear and moist.   Eyes:    EOM are normal. Pupils are equal, round, and reactive to light.   Neck:    Neck supple.   Cardiovascular:  Normal rate, regular rhythm and normal heart sounds.      Exam reveals no gallop and no friction rub.       No murmur heard.  Pulmonary/Chest:  Effort normal and breath sounds normal.      No respiratory distress. No wheezes. No rales.      No reproducible chest wall pain.  Abdominal:   Soft. No distension. No tenderness. No rebound and no guarding.   Musculoskeletal:  No cervical spine tenderness. General tenderness to left shoulder, humerus and forearm. No tenderness to the left elbow. Limited ROM to left shoulder due to pain. 2+ distal pulses  Neurological:   GCS 15. Left upper extremity neurovascularly intact. Alert and oriented to person, place, and time.           Moves all 4 extremities spontaneously    Skin:    Superficial 6 cm non-repairable skin laceration to left posterior proximal forearm. No rash noted. No pallor.     Emergency Department Course     Imaging:  Radius/Ulna XR,  PA &LAT, left   Final Result   IMPRESSION: Limited 1 view study. Osteopenia. Corticated ossicle at the ulnar styloid compatible with old injury. No evidence of acute forearm fracture.      Humerus XR,  G/E 2 views, left   Final Result   IMPRESSION: Left humerus proximal metaphyseal mildly comminuted fracture, new since May 2021. Osteopenia. 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 Left rib deformity compatible with old healed    fractures.      XR Shoulder Left 1 View   Final Result   IMPRESSION: Left humerus proximal metaphyseal mildly comminuted fracture,  new since May 2021. Osteopenia. 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 Left rib deformity compatible with old healed    fractures.      Head CT w/o contrast   Final Result   IMPRESSION:   HEAD CT:   1.  No acute intracranial hemorrhage or calvarial fracture.   2.  Mild to moderate volume loss and presumed chronic small vessel ischemic changes.      CERVICAL SPINE CT:   1.  No CT evidence for acute fracture or posttraumatic subluxation.   2.  Degenerative changes as highlighted above.   3.  Multifocal lucencies, the majority of which are along the endplates, favored to be degenerative cysts. Correlation for multifocal osseous metastases or multiple myeloma advised.   4.  Widening of the right C4-C5 facet joint with marginal erosions. This is favored to reflect degenerative change.         Cervical spine CT w/o contrast   Final Result   IMPRESSION:   HEAD CT:   1.  No acute intracranial hemorrhage or calvarial fracture.   2.  Mild to moderate volume loss and presumed chronic small vessel ischemic changes.      CERVICAL SPINE CT:   1.  No CT evidence for acute fracture or posttraumatic subluxation.   2.  Degenerative changes as highlighted above.   3.  Multifocal lucencies, the majority of which are along the endplates, favored to be degenerative cysts. Correlation for multifocal osseous metastases or multiple myeloma advised.   4.  Widening of the right C4-C5 facet joint with marginal erosions. This is favored to reflect degenerative change.           Report per radiology    Emergency Department Course:    Reviewed:  I reviewed nursing notes, vitals, past medical history, Care Everywhere and [facility]    Assessments:  6747 I obtained history and examined the patient as noted above.   0768 I rechecked the patient and explained findings.   0752 I spoke with the patients daughter over the phone.     Consults:  0642 I spoke with the XR tech about the patient's imaging.      Interventions:  0554 Tylenol, 975 mg, PO  0705 Tdap (tetanus-diphtheria-acell pertussis), 0.5 ml, IM     Disposition:  The patient was discharged to home.     Impression & Plan     Medical Decision Making:  Eddie Stephens is a 89 year old male that came in status post mechanical fall. He is quite with it for his age and clearly states that he did not pass out or lose consciousness but was asleep on the toilet when he fell and landed on his left arm. He was complaining of head pain, neck pain, left shoulder pain, as well as a non repairable skin tear. Skin tear was cleaned and Tegaderm was placed. This is not repairable, will heal over time. I did end up getting a CT of his head cervical spine which is fortunately negative as well as x-ray of his left upper extremities. 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 This is not acute. He was placed in a shoulder sling, is ambulating without difficulty, and will be safe for discharge and follow up with orthopedics. He is told to return for any worsening symptoms or concerns.    Diagnosis:    ICD-10-CM    1. Closed fracture of proximal end of humerus, unspecified fracture morphology, unspecified laterality, initial encounter  S42.209A    2. Contusion of head, unspecified part of head, initial encounter  S00.93XA        Scribe Disclosure:  FATMATA Lexiyajaira De La Cruz, am serving as a scribe at 5:29 AM on 12/26/2021 to document services personally performed by Horacio Rueda MD based on my observations and the provider's statements to me.          Horacio Rueda MD  12/26/21 9868

## 2021-12-26 NOTE — ED NOTES
Patient medicated for pain as ordered. Updated on continued POC and waits. Denies any needs at present. Continue to monitor.

## 2021-12-26 NOTE — ED TRIAGE NOTES
"EMS REPORT:    Patient fall from seated position in bathroom. Patient apparently fell asleep while seated on toilet. This is reported to be a \"normal\" occurrence. EMS called by spouse. Patient has a contusion to left forehead. Negative for neck pain. 2 inch avulsion to left forearm - dressed. Denies blood thinners. Sling to left arm. Left shoulder pain after fall. Hx: Right shoulder replacement. Macular degeneration. Anemia. Negative stroke scale.     Blood sugar WNL. Denies dizziness, changes of vision. PERRL. VSS.   "

## 2021-12-26 NOTE — ED NOTES
Patient reports need to urinate. Patient given urinal to use. Patient denies needing assistance. Patient given privacy. Call light in reach.

## 2021-12-27 NOTE — TELEPHONE ENCOUNTER
Spoke to Dr. Shearer about message.  He advised that the pt should really be seeing GI more urgently, but not emergent.  He is not going to give an antibiotic as pt has been having GI issues for a couple of months.  He gave a verbal order that if the pt does not have a GI we can put in a referral to [facility], dx colitis.      Called Dayana.  Advised of above.  [facility]tr [facility]  She said it has been in the works to get [facility].  Advised to try to call them and see if he can be seen sooner.  Advised they could also try to reach [facility] to see if they could try and pull any strings to see if the pt could get in sooner with GI - advised pcp Douglas Cruz is out all week.  Also advised we are happy to put in a referral at [facility].  She will call us back if she wants that.

## 2021-12-27 NOTE — TELEPHONE ENCOUNTER
Patient likely has diverticulitis, wondering if antibiotic can be prescribed. If unable to get a hold of Susan, please call Dayana at [phone].  -Monserrat Cano

## 2021-12-31 ENCOUNTER — TELEPHONE (OUTPATIENT)
Dept: FAMILY MEDICINE | Facility: CLINIC | Age: 86
End: 2021-12-31
Payer: MEDICARE

## 2021-12-31 ENCOUNTER — TRANSFERRED RECORDS (OUTPATIENT)
Dept: HEALTH INFORMATION MANAGEMENT | Facility: CLINIC | Age: 86
End: 2021-12-31
Payer: MEDICARE

## 2021-12-31 NOTE — TELEPHONE ENCOUNTER
Put in Courtney Amaya box, when completed, fax back to [phone]   Normal rate, regular rhythm.  Heart sounds S1, S2.  No murmurs, rubs or gallops.

## 2022-01-01 ENCOUNTER — TELEPHONE (OUTPATIENT)
Dept: FAMILY MEDICINE | Facility: CLINIC | Age: 87
End: 2022-01-01

## 2022-01-03 NOTE — TELEPHONE ENCOUNTER
Called [phone], spoke to clinic, advised that we will need to wait until PCP comes back into clinic 01/17/22 in order to fill the form out.    They said this was fine as the patient didn't have any future appointments scheduled yet.     Put in Douglas Conley.

## 2022-01-03 NOTE — TELEPHONE ENCOUNTER
Form is for medical consultation prior to dental procedure. Please contact patient and see if this can wait until Douglas is back.    Courtney Amaya PA-C

## 2022-01-06 ENCOUNTER — TELEPHONE (OUTPATIENT)
Dept: FAMILY MEDICINE | Facility: CLINIC | Age: 87
End: 2022-01-06
Payer: MEDICARE

## 2022-01-06 ENCOUNTER — E-VISIT (OUTPATIENT)
Dept: FAMILY MEDICINE | Facility: CLINIC | Age: 87
End: 2022-01-06
Payer: MEDICARE

## 2022-01-06 DIAGNOSIS — R35.0 URINARY FREQUENCY: Primary | ICD-10-CM

## 2022-01-06 PROCEDURE — 87086 URINE CULTURE/COLONY COUNT: CPT | Performed by: NURSE PRACTITIONER

## 2022-01-06 PROCEDURE — 99421 OL DIG E/M SVC 5-10 MIN: CPT | Performed by: NURSE PRACTITIONER

## 2022-01-06 PROCEDURE — 81001 URINALYSIS AUTO W/SCOPE: CPT | Performed by: NURSE PRACTITIONER

## 2022-01-06 PROCEDURE — 87186 SC STD MICRODIL/AGAR DIL: CPT | Performed by: NURSE PRACTITIONER

## 2022-01-06 NOTE — TELEPHONE ENCOUNTER
Looks like the pt has submitted an e-visit through .  Called and spoke to Dtr.  Advised that the pt will need some kind of visit.  Tried to see what other options we have for the pt and family.  Douglas Cruz out today.  Advised to keep virtual  appt.  Scheduled lab only appt.      Checked with Kristal Rock.  She advised for pt to keep virtual  appt as they are typically pretty quick.

## 2022-01-06 NOTE — TELEPHONE ENCOUNTER
"Patient's daughter calling (on consent to communicate). Daughter reports patient is waking at night every hour to go to the bathroom. Wondering if it is a UTI? Reports frequency. Denies burning, stinging, pain, blood in the urine. Daughter reports last UTI \"was like this, he was going so frequently.\"    Best to send orders for urine testing to Assistive Living facility.   [facility]  Fax: [phone]  Attention to [facility] or [facility].      Pharmacy verified.     Daughter reports patient gets un upset stomach with Bactrim and recently finished Amoxicillin for tooth pulling.     Routing to provider and covering providers. Does patient need to be seen? Can a UA be ordered for their Assistive Living?     Sarah SR RN   [facility]        "

## 2022-01-07 LAB
ALBUMIN UR-MCNC: NEGATIVE MG/DL
AMORPH CRY #/AREA URNS HPF: ABNORMAL /HPF
APPEARANCE UR: CLEAR
BILIRUB UR QL STRIP: NEGATIVE
CAOX CRY #/AREA URNS HPF: ABNORMAL /HPF
COLOR UR AUTO: YELLOW
GLUCOSE UR STRIP-MCNC: NEGATIVE MG/DL
HGB UR QL STRIP: NEGATIVE
HYALINE CASTS #/AREA URNS LPF: ABNORMAL /LPF
KETONES UR STRIP-MCNC: ABNORMAL MG/DL
LEUKOCYTE ESTERASE UR QL STRIP: ABNORMAL
MUCOUS THREADS #/AREA URNS LPF: PRESENT /LPF
NITRATE UR QL: NEGATIVE
PH UR STRIP: 5 [PH] (ref 5–7)
RBC #/AREA URNS AUTO: ABNORMAL /HPF
SP GR UR STRIP: 1.02 (ref 1–1.03)
SQUAMOUS #/AREA URNS AUTO: ABNORMAL /LPF
UROBILINOGEN UR STRIP-ACNC: 0.2 E.U./DL
WBC #/AREA URNS AUTO: ABNORMAL /HPF

## 2022-01-09 LAB
BACTERIA UR CULT: ABNORMAL
BACTERIA UR CULT: ABNORMAL

## 2022-01-10 ENCOUNTER — MYC MEDICAL ADVICE (OUTPATIENT)
Dept: FAMILY MEDICINE | Facility: CLINIC | Age: 87
End: 2022-01-10
Payer: MEDICARE

## 2022-01-10 DIAGNOSIS — N30.00 ACUTE CYSTITIS WITHOUT HEMATURIA: Primary | ICD-10-CM

## 2022-01-10 RX ORDER — NITROFURANTOIN 25 MG/5ML
50 SUSPENSION ORAL 2 TIMES DAILY
Qty: 100 ML | Refills: 0 | Status: SHIPPED | OUTPATIENT
Start: 2022-01-10 | End: 2022-01-15

## 2022-01-11 ENCOUNTER — TELEPHONE (OUTPATIENT)
Dept: FAMILY MEDICINE | Facility: CLINIC | Age: 87
End: 2022-01-11
Payer: MEDICARE

## 2022-01-11 RX ORDER — NITROFURANTOIN MACROCRYSTALS 50 MG/1
50 CAPSULE ORAL 2 TIMES DAILY
Qty: 10 CAPSULE | Refills: 0 | Status: SHIPPED | OUTPATIENT
Start: 2022-01-11

## 2022-01-11 NOTE — TELEPHONE ENCOUNTER
Pharmacy: Caution advised in elderly. Please advise.    nitroFURantoin (FURADANTIN) 25 MG/5ML suspension

## 2022-01-11 NOTE — TELEPHONE ENCOUNTER
Lower dose.  With his allergies and resistance in culture this was one of the only options.  ALEJANDRINA

## 2022-01-13 ENCOUNTER — MEDICAL CORRESPONDENCE (OUTPATIENT)
Dept: HEALTH INFORMATION MANAGEMENT | Facility: CLINIC | Age: 87
End: 2022-01-13
Payer: MEDICARE

## 2022-01-17 ENCOUNTER — OFFICE VISIT (OUTPATIENT)
Dept: FAMILY MEDICINE | Facility: CLINIC | Age: 87
End: 2022-01-17
Payer: MEDICARE

## 2022-01-17 VITALS
HEIGHT: 70 IN | DIASTOLIC BLOOD PRESSURE: 60 MMHG | SYSTOLIC BLOOD PRESSURE: 100 MMHG | BODY MASS INDEX: 23.05 KG/M2 | WEIGHT: 161 LBS | RESPIRATION RATE: 16 BRPM | HEART RATE: 56 BPM | TEMPERATURE: 98.2 F | OXYGEN SATURATION: 98 %

## 2022-01-17 DIAGNOSIS — R29.6 RECURRENT FALLS: Primary | ICD-10-CM

## 2022-01-17 DIAGNOSIS — K14.6 TONGUE SORE: ICD-10-CM

## 2022-01-17 DIAGNOSIS — S22.32XS CLOSED FRACTURE OF ONE RIB OF LEFT SIDE, SEQUELA: ICD-10-CM

## 2022-01-17 DIAGNOSIS — I71.40 ABDOMINAL AORTIC ANEURYSM (AAA) WITHOUT RUPTURE (H): ICD-10-CM

## 2022-01-17 DIAGNOSIS — S42.292S OTHER CLOSED DISPLACED FRACTURE OF PROXIMAL END OF LEFT HUMERUS, SEQUELA: ICD-10-CM

## 2022-01-17 PROCEDURE — 99213 OFFICE O/P EST LOW 20 MIN: CPT | Performed by: PHYSICIAN ASSISTANT

## 2022-01-17 RX ORDER — ACETAMINOPHEN 500 MG
500-1000 TABLET ORAL EVERY 6 HOURS PRN
COMMUNITY

## 2022-01-17 RX ORDER — BISACODYL 10 MG
10 SUPPOSITORY, RECTAL RECTAL DAILY PRN
COMMUNITY

## 2022-01-17 RX ORDER — EMOLLIENT BASE
CREAM (GRAM) TOPICAL PRN
COMMUNITY

## 2022-01-17 RX ORDER — IBUPROFEN 200 MG
200 TABLET ORAL EVERY 4 HOURS PRN
COMMUNITY

## 2022-01-17 RX ORDER — CLINDAMYCIN HCL 150 MG
150 CAPSULE ORAL 4 TIMES DAILY
COMMUNITY

## 2022-01-17 RX ORDER — BENZONATATE 100 MG/1
100 CAPSULE ORAL 3 TIMES DAILY PRN
COMMUNITY

## 2022-01-17 RX ORDER — BROMPHENIRAMIN/PSEUDOEPHEDRINE 1-15MG/5ML
LIQUID (ML) ORAL
COMMUNITY

## 2022-01-17 RX ORDER — INCONTINENCE PAD,LINER,DISP
EACH MISCELLANEOUS
COMMUNITY

## 2022-01-17 RX ORDER — DOCUSATE SODIUM 100 MG/1
100 CAPSULE, LIQUID FILLED ORAL 2 TIMES DAILY
COMMUNITY

## 2022-01-17 ASSESSMENT — PAIN SCALES - GENERAL: PAINLEVEL: NO PAIN (0)

## 2022-01-17 ASSESSMENT — MIFFLIN-ST. JEOR: SCORE: 1343.6

## 2022-01-17 NOTE — PROGRESS NOTES
Assessment & Plan     Recurrent falls  Other closed displaced fracture of proximal end of left humerus, sequela  Closed fracture of one rib of left side, sequela  Order placed for PT/OT assessment.  - Home Care Nursing Referral    Abdominal aortic aneurysm (AAA) without rupture (H)  Chronic, monitoring    Tongue sore  Does not look like thrush at this point. Would recommend follow-up with ENT for further evaluation.  - Otolaryngology Referral; Future    No follow-ups on file.    Courtney Amaya PA-C  [employer] MADELAINE Villagran is a 89 year old who presents for the following health issues  accompanied by his daughter.    HPI     Patient is here get orders to be assessed for PT and OT services. He was in assisted living and now has transferred to a higher level of care in the same facility - Care suites with 24 hour care. He moved on Friday (3 days ago). They need a referral for home health PT/OT evaluation.     He has had four falls since May 2021 and one fall last year. Daughter says he has had issues with low blood pressure and falls seem related to standing up and getting lightheaded. His most recent fall was in December 2021 and he sustained a left humerus fracture and left 11th posterior rib fracture. He is [facility] with [facility] for this and daughter says that the orthopedist noted that, for the left humerus fracture, alignment is passable, okay if heals in this position, goal is no relative pain. He seems to be doing well with this and pain is controlled, but he does need assistance with ADLs.    Daughter also notes sores on his lips and [facility] this. She also noticed a white patch on the bottom of his tongue and she is not sure how long it has been there. No other white areas on tongue. He was treated for thrush a few moths ago and this looks different than his thrush did at that time.    Review of Systems  "  Constitutional, HEENT, cardiovascular, pulmonary, gi and gu systems are negative, except as otherwise noted.      Objective    /60 (BP Location: Right arm, Patient Position: Sitting, Cuff Size: Adult Large)   Pulse 56   Temp 98.2  F (36.8  C) (Oral)   Resp 16   Ht 1.765 m (5' 9.5\")   Wt 73 kg (161 lb)   SpO2 98%   BMI 23.43 kg/m    Body mass index is 23.43 kg/m .  Physical Exam   GENERAL: healthy, alert and no distress  HENT: ~3-4 mm white patch on bottom of tongue laterally, no other white patches on tongue  RESP: lungs clear to auscultation - no rales, rhonchi or wheezes  CV: regular rate and rhythm  MS: no gross musculoskeletal defects noted, no edema    "

## 2022-01-19 ENCOUNTER — MEDICAL CORRESPONDENCE (OUTPATIENT)
Dept: HEALTH INFORMATION MANAGEMENT | Facility: CLINIC | Age: 87
End: 2022-01-19
Payer: MEDICARE

## 2022-01-20 ENCOUNTER — TELEPHONE (OUTPATIENT)
Dept: FAMILY MEDICINE | Facility: CLINIC | Age: 87
End: 2022-01-20
Payer: MEDICARE

## 2022-01-20 NOTE — TELEPHONE ENCOUNTER
Reason for call:  Home Healthcare Reason for Call:  Home Health Care    Sol with [facility] called regarding (reason for call): orders    Orders are needed for this patient.     Declined HHA and no need for skilled nursing.    PT: Dipti happening today    OT: Delay eval to week of 1/26/22    Med interaction with fleet enema and ibuprofen, need okay to continue administering    Pt Provider: Thom Cruz    Phone Number Homecare Nurse can be reached at: [phone]    Can we leave a detailed message on this number? YES    Call taken on 1/20/2022 at 8:28 AM by Monserrat Cano

## 2022-01-20 NOTE — TELEPHONE ENCOUNTER
Verbal ok given to Sol for PT, OT eval delay, No HHA and skilled nursing, and continue with ibuprofen and fleets enema orders.    Grazyna Vargas RN

## 2022-01-25 ENCOUNTER — MEDICAL CORRESPONDENCE (OUTPATIENT)
Dept: HEALTH INFORMATION MANAGEMENT | Facility: CLINIC | Age: 87
End: 2022-01-25

## 2022-01-25 ENCOUNTER — TELEPHONE (OUTPATIENT)
Dept: FAMILY MEDICINE | Facility: CLINIC | Age: 87
End: 2022-01-25
Payer: MEDICARE

## 2022-01-25 DIAGNOSIS — Z53.9 DIAGNOSIS NOT YET DEFINED: Primary | ICD-10-CM

## 2022-01-25 PROCEDURE — G0180 MD CERTIFICATION HHA PATIENT: HCPCS | Performed by: PHYSICIAN ASSISTANT

## 2022-01-25 NOTE — TELEPHONE ENCOUNTER
Received form from [facility]. When done fax back to [phone].    In Douglas Cruz's  in basket to review.

## 2022-01-26 ENCOUNTER — TELEPHONE (OUTPATIENT)
Dept: FAMILY MEDICINE | Facility: CLINIC | Age: 87
End: 2022-01-26
Payer: MEDICARE

## 2022-01-26 NOTE — TELEPHONE ENCOUNTER
Reason for call:  Home Healthcare Reason for Call:  Home Health Care    Bertha with [facility] called regarding (reason for call): OT orders    Orders are needed for this patient.     OT: 1x/week for 4 weeks, addressing ROM and home exercise program    Pt Provider: Douglas Cruz PA-C    Phone Number Homecare Nurse can be reached at: [phone]    Can we leave a detailed message on this number? YES    Call taken on 1/26/2022 at 3:37 PM by Monserrat Cano

## 2022-01-31 ENCOUNTER — MEDICAL CORRESPONDENCE (OUTPATIENT)
Dept: HEALTH INFORMATION MANAGEMENT | Facility: CLINIC | Age: 87
End: 2022-01-31
Payer: MEDICARE

## 2022-01-31 ENCOUNTER — TELEPHONE (OUTPATIENT)
Dept: FAMILY MEDICINE | Facility: CLINIC | Age: 87
End: 2022-01-31
Payer: MEDICARE

## 2022-01-31 NOTE — TELEPHONE ENCOUNTER
Received orders, placed in Douglas ADEN in basket.    Please review, sign and fax back to [phone].

## 2022-02-17 ENCOUNTER — TELEPHONE (OUTPATIENT)
Dept: FAMILY MEDICINE | Facility: CLINIC | Age: 87
End: 2022-02-17
Payer: MEDICARE

## 2022-02-17 NOTE — TELEPHONE ENCOUNTER
The Home Care/Assisted Living/Nursing Facility is calling regarding an established patient.  Has the patient seen Home Care in the past or is currently residing in Assisted Living or Nursing Facility? Yes.     Bertha - OT calling from [facility] requesting the following orders that are within the Home Care, Assisted Living or Nursing Home Eval and Treatment standing order and can be signed as standing order signature required by RN.    Preferred Call Back Number: [phone]    PT/OT/Speech Therapy occupational therapy 1x/week for 1 week for continued ROM and ADLs. Verbal order given.     Any additional Orders:  Any order requested not stated above are outside of the standing order and must be routed to a licensed practitioner for approval.    No    Writer has verified Requestor will send fax to have orders signed.

## 2022-02-21 ENCOUNTER — TELEPHONE (OUTPATIENT)
Dept: FAMILY MEDICINE | Facility: CLINIC | Age: 87
End: 2022-02-21
Payer: MEDICARE

## 2022-02-21 ENCOUNTER — MEDICAL CORRESPONDENCE (OUTPATIENT)
Dept: HEALTH INFORMATION MANAGEMENT | Facility: CLINIC | Age: 87
End: 2022-02-21
Payer: MEDICARE

## 2022-02-21 NOTE — TELEPHONE ENCOUNTER
Rec'd Orders from [facility]. Placed in PCP basket for review and signature. Fax [phone]    Kallie King-

## 2022-02-21 NOTE — TELEPHONE ENCOUNTER
Received form from [facility]. When done fax back to [phone].    In KEIKO Whelan in basket to review.

## 2022-02-23 ENCOUNTER — MEDICAL CORRESPONDENCE (OUTPATIENT)
Dept: HEALTH INFORMATION MANAGEMENT | Facility: CLINIC | Age: 87
End: 2022-02-23
Payer: MEDICARE

## 2022-02-24 ENCOUNTER — TELEPHONE (OUTPATIENT)
Dept: FAMILY MEDICINE | Facility: CLINIC | Age: 87
End: 2022-02-24
Payer: MEDICARE

## 2022-02-24 NOTE — TELEPHONE ENCOUNTER
Received orders, placed in Douglas ADEN in basket.    Please review, sign and fax back to [phone].

## 2022-03-07 ENCOUNTER — MEDICAL CORRESPONDENCE (OUTPATIENT)
Dept: HEALTH INFORMATION MANAGEMENT | Facility: CLINIC | Age: 87
End: 2022-03-07
Payer: MEDICARE

## 2022-03-07 ENCOUNTER — TELEPHONE (OUTPATIENT)
Dept: FAMILY MEDICINE | Facility: CLINIC | Age: 87
End: 2022-03-07
Payer: MEDICARE

## 2022-03-07 NOTE — TELEPHONE ENCOUNTER
Received form from [facility]. When done fax back to [phone].    In Douglas Cruz's in basket to review.

## 2022-05-27 ENCOUNTER — HOSPITAL ENCOUNTER (EMERGENCY)
Facility: CLINIC | Age: 87
Discharge: HOME | End: 2022-05-27
Attending: EMERGENCY MEDICINE | Admitting: EMERGENCY MEDICINE
Payer: MEDICARE

## 2022-05-27 VITALS
SYSTOLIC BLOOD PRESSURE: 103 MMHG | DIASTOLIC BLOOD PRESSURE: 59 MMHG | OXYGEN SATURATION: 97 % | RESPIRATION RATE: 20 BRPM | HEART RATE: 84 BPM | TEMPERATURE: 97.8 F

## 2022-05-27 DIAGNOSIS — I95.9 TRANSIENT HYPOTENSION: ICD-10-CM

## 2022-05-27 LAB
ALBUMIN UR-MCNC: 30 MG/DL
ANION GAP SERPL CALCULATED.3IONS-SCNC: 3 MMOL/L (ref 3–14)
APPEARANCE UR: CLEAR
BASE EXCESS BLDV CALC-SCNC: -1.8 MMOL/L (ref -7.7–1.9)
BASOPHILS # BLD AUTO: 0 10E3/UL (ref 0–0.2)
BASOPHILS NFR BLD AUTO: 0 %
BILIRUB UR QL STRIP: NEGATIVE
BUN SERPL-MCNC: 38 MG/DL (ref 7–30)
CALCIUM SERPL-MCNC: 8.8 MG/DL (ref 8.5–10.1)
CHLORIDE BLD-SCNC: 107 MMOL/L (ref 94–109)
CO2 SERPL-SCNC: 24 MMOL/L (ref 20–32)
COLOR UR AUTO: YELLOW
CREAT SERPL-MCNC: 1.21 MG/DL (ref 0.66–1.25)
EOSINOPHIL # BLD AUTO: 0 10E3/UL (ref 0–0.7)
EOSINOPHIL NFR BLD AUTO: 0 %
ERYTHROCYTE [DISTWIDTH] IN BLOOD BY AUTOMATED COUNT: 14.7 % (ref 10–15)
FLUAV RNA SPEC QL NAA+PROBE: NEGATIVE
FLUBV RNA RESP QL NAA+PROBE: NEGATIVE
GFR SERPL CREATININE-BSD FRML MDRD: 54 ML/MIN/1.73M2
GLUCOSE BLD-MCNC: 117 MG/DL (ref 70–99)
GLUCOSE UR STRIP-MCNC: NEGATIVE MG/DL
HCO3 BLDV-SCNC: 22 MMOL/L (ref 21–28)
HCT VFR BLD AUTO: 31.4 % (ref 40–53)
HGB BLD-MCNC: 10 G/DL (ref 13.3–17.7)
HGB UR QL STRIP: NEGATIVE
HOLD SPECIMEN: NORMAL
HYALINE CASTS: 1 /LPF
IMM GRANULOCYTES # BLD: 0 10E3/UL
IMM GRANULOCYTES NFR BLD: 0 %
KETONES UR STRIP-MCNC: NEGATIVE MG/DL
LACTATE SERPL-SCNC: 1.8 MMOL/L (ref 0.7–2)
LEUKOCYTE ESTERASE UR QL STRIP: NEGATIVE
LYMPHOCYTES # BLD AUTO: 0.5 10E3/UL (ref 0.8–5.3)
LYMPHOCYTES NFR BLD AUTO: 4 %
MCH RBC QN AUTO: 29.9 PG (ref 26.5–33)
MCHC RBC AUTO-ENTMCNC: 31.8 G/DL (ref 31.5–36.5)
MCV RBC AUTO: 94 FL (ref 78–100)
MONOCYTES # BLD AUTO: 0.3 10E3/UL (ref 0–1.3)
MONOCYTES NFR BLD AUTO: 3 %
MUCOUS THREADS #/AREA URNS LPF: PRESENT /LPF
NEUTROPHILS # BLD AUTO: 10.3 10E3/UL (ref 1.6–8.3)
NEUTROPHILS NFR BLD AUTO: 93 %
NITRATE UR QL: NEGATIVE
NRBC # BLD AUTO: 0 10E3/UL
NRBC BLD AUTO-RTO: 0 /100
O2/TOTAL GAS SETTING VFR VENT: 0 %
OXYHGB MFR BLDV: 61 % (ref 70–75)
PCO2 BLDV: 36 MM HG (ref 40–50)
PH BLDV: 7.41 [PH] (ref 7.32–7.43)
PH UR STRIP: 5 [PH] (ref 5–7)
PLATELET # BLD AUTO: 112 10E3/UL (ref 150–450)
PO2 BLDV: 32 MM HG (ref 25–47)
POTASSIUM BLD-SCNC: 4.1 MMOL/L (ref 3.4–5.3)
RBC # BLD AUTO: 3.35 10E6/UL (ref 4.4–5.9)
RBC URINE: 3 /HPF
RSV RNA SPEC NAA+PROBE: NEGATIVE
SARS-COV-2 RNA RESP QL NAA+PROBE: NEGATIVE
SODIUM SERPL-SCNC: 134 MMOL/L (ref 133–144)
SP GR UR STRIP: 1.02 (ref 1–1.03)
SQUAMOUS EPITHELIAL: <1 /HPF
UROBILINOGEN UR STRIP-MCNC: NORMAL MG/DL
WBC # BLD AUTO: 11.2 10E3/UL (ref 4–11)
WBC URINE: 2 /HPF

## 2022-05-27 PROCEDURE — 87040 BLOOD CULTURE FOR BACTERIA: CPT | Performed by: EMERGENCY MEDICINE

## 2022-05-27 PROCEDURE — 81001 URINALYSIS AUTO W/SCOPE: CPT | Performed by: EMERGENCY MEDICINE

## 2022-05-27 PROCEDURE — 96360 HYDRATION IV INFUSION INIT: CPT

## 2022-05-27 PROCEDURE — 82805 BLOOD GASES W/O2 SATURATION: CPT | Performed by: EMERGENCY MEDICINE

## 2022-05-27 PROCEDURE — 258N000003 HC RX IP 258 OP 636: Performed by: EMERGENCY MEDICINE

## 2022-05-27 PROCEDURE — 36415 COLL VENOUS BLD VENIPUNCTURE: CPT | Performed by: EMERGENCY MEDICINE

## 2022-05-27 PROCEDURE — C9803 HOPD COVID-19 SPEC COLLECT: HCPCS

## 2022-05-27 PROCEDURE — 93005 ELECTROCARDIOGRAM TRACING: CPT

## 2022-05-27 PROCEDURE — 83605 ASSAY OF LACTIC ACID: CPT | Performed by: EMERGENCY MEDICINE

## 2022-05-27 PROCEDURE — 99284 EMERGENCY DEPT VISIT MOD MDM: CPT | Mod: CS,25

## 2022-05-27 PROCEDURE — 85025 COMPLETE CBC W/AUTO DIFF WBC: CPT | Performed by: EMERGENCY MEDICINE

## 2022-05-27 PROCEDURE — 80048 BASIC METABOLIC PNL TOTAL CA: CPT | Performed by: EMERGENCY MEDICINE

## 2022-05-27 PROCEDURE — 87637 SARSCOV2&INF A&B&RSV AMP PRB: CPT | Performed by: EMERGENCY MEDICINE

## 2022-05-27 PROCEDURE — 96361 HYDRATE IV INFUSION ADD-ON: CPT

## 2022-05-27 RX ADMIN — SODIUM CHLORIDE 500 ML: 9 INJECTION, SOLUTION INTRAVENOUS at 16:06

## 2022-05-27 RX ADMIN — SODIUM CHLORIDE 500 ML: 9 INJECTION, SOLUTION INTRAVENOUS at 16:16

## 2022-05-27 ASSESSMENT — ENCOUNTER SYMPTOMS: CONFUSION: 1

## 2022-05-27 NOTE — ED PROVIDER NOTES
History   Chief Complaint:  Confusion and Hypotension    The history is provided by the patient, a relative and the spouse.      Eddie Stephens is a 89 year old male with history of MICHAEL, macular degeneration, and depression who presents with hypotension and confusion. Around 2200 last night, the patient called his brother saying he was cold and shivering. His brother noticed his altered mental status and confusion. He would normally call his wife as she lives in the room next to him and usually provides him with care. Today he did not get out of bed until after 1000 which is abnormal. His wife had to check on him this morning to wake him up but she states he was very tired. His family then read his blood pressure to be 78/43.After drinking 12 oz of water, it raised to 87/46. He then went to use the restroom and had a soft stool before they measured his blood pressure to be 76/49. [facility] where the patient was advised to march in place before they read his blood pressure while standing to be 80/52 and was advised to visit the ED. He has had a similar episode of hypotension and drank fluids which quickly resolved his abnormal blood pressure. The patient states he is feeling good as he presents in the ED and his confusion has resolved.    Review of Systems   Psychiatric/Behavioral: Positive for confusion.   All other systems reviewed and are negative.    Allergies:  Ambien  Carbidopa-levodopa  Detrol  Erythromycin  Gabapentin  Keflex  Klonopin  Oxybutynin  Prednisone  Ropinirole  Terazosin    Medications:  Acidophilus lactobacillus  Albuterol inhaler  Alfuzosin  Benzonatate  Bisacodyl  Cholecalciferol  Clindamycin  Diclofenac  Docusate sodium  Fluticasone-salmeterol inhaler  Hydrocodone-acetaminophen  Magnesium citrate solution  Multiple vitamins-minerals  Nitrofurantoin macrocrystal  Omega-3 fatty acids  Polyethylene glycol 3350  Pramipexole  Psyllium  Sodium  phosphates  Tramadol  Robaxin    Past Medical History:     MICHAEL  Macular degeneration  Anemia  Restless leg syndrome  Rosacea  Hearing loss  Atrial enlargement  Depression  Osteoarthritis  Basal cell carcinoma  Abdominal aneurysm    Past Surgical History:    Mohs surgery    Family History:    Mother: CAD, hypertension    Social History:  The patient presents to the ED with two of his family members. The patient arrived to the ED via car.    Physical Exam     Patient Vitals for the past 24 hrs:   BP Temp Pulse Resp SpO2   05/27/22 1745 122/62 -- 80 -- 99 %   05/27/22 1730 98/57 -- 76 -- 95 %   05/27/22 1715 94/52 -- 75 -- 96 %   05/27/22 1700 92/50 -- 74 -- 96 %   05/27/22 1645 96/54 -- 76 -- 95 %   05/27/22 1637 97/55 -- -- -- 97 %   05/27/22 1615 93/51 -- 79 -- 95 %   05/27/22 1600 107/58 -- 83 -- 97 %   05/27/22 1545 108/49 -- 80 -- 100 %   05/27/22 1530 115/49 -- 83 -- 100 %   05/27/22 1529 108/51 -- -- -- 96 %   05/27/22 1520 (!) 79/44 97.8  F (36.6  C) 81 20 100 %       Physical Exam  General/Appearance: appears stated age, well-groomed, appears comfortable  Eyes: EOMI, no scleral injection, no icterus  ENT: MMM  Neck: supple, nl ROM, no stiffness  Cardiovascular: RRR, nl S1S2, no m/r/g, 2+ pulses in all 4 extremities, cap refill <2sec  Respiratory: CTAB, good air movement throughout, no wheezes/rhonchi/rales, no increased WOB, no retractions  GI: abd soft, non-distended, nttp,  no HSM, no rebound, no guarding, nl BS  MSK: PATEL, good tone, no bony abnormality  Skin: warm and well-perfused, no rash, no edema, no ecchymosis, nl turgor  Neuro: GCS 15, alert and oriented, no gross focal neuro deficits  Psych: interacts appropriately  Heme: no petechia, no purpura, no active bleeding    Emergency Department Course   ECG  ECG taken at 1620, ECG read at 1632  Sinus rhythm with premature atrial complexes   Rate 80 bpm. SD interval 166 ms. QRS duration 94 ms. QT/QTc 414/477 ms. P-R-T axes 49 -14 -1.     Laboratory:  Labs  Ordered and Resulted from Time of ED Arrival to Time of ED Departure   BASIC METABOLIC PANEL - Abnormal       Result Value    Sodium 134      Potassium 4.1      Chloride 107      Carbon Dioxide (CO2) 24      Anion Gap 3      Urea Nitrogen 38 (*)     Creatinine 1.21      Calcium 8.8      Glucose 117 (*)     GFR Estimate 54 (*)    BLOOD GAS VENOUS WITH OXYHEMOGLOBIN - Abnormal    pH Venous 7.41      pCO2 Venous 36 (*)     pO2 Venous 32      Bicarbonate Venous 22      FIO2 0      Oxyhemoglobin Venous 61 (*)     Base Excess/Deficit (+/-) -1.8     ROUTINE UA WITH MICROSCOPIC REFLEX TO CULTURE - Abnormal    Color Urine Yellow      Appearance Urine Clear      Glucose Urine Negative      Bilirubin Urine Negative      Ketones Urine Negative      Specific Gravity Urine 1.023      Blood Urine Negative      pH Urine 5.0      Protein Albumin Urine 30  (*)     Urobilinogen Urine Normal      Nitrite Urine Negative      Leukocyte Esterase Urine Negative      Mucus Urine Present (*)     RBC Urine 3 (*)     WBC Urine 2      Squamous Epithelials Urine <1      Hyaline Casts Urine 1     CBC WITH PLATELETS AND DIFFERENTIAL - Abnormal    WBC Count 11.2 (*)     RBC Count 3.35 (*)     Hemoglobin 10.0 (*)     Hematocrit 31.4 (*)     MCV 94      MCH 29.9      MCHC 31.8      RDW 14.7      Platelet Count 112 (*)     % Neutrophils 93      % Lymphocytes 4      % Monocytes 3      % Eosinophils 0      % Basophils 0      % Immature Granulocytes 0      NRBCs per 100 WBC 0      Absolute Neutrophils 10.3 (*)     Absolute Lymphocytes 0.5 (*)     Absolute Monocytes 0.3      Absolute Eosinophils 0.0      Absolute Basophils 0.0      Absolute Immature Granulocytes 0.0      Absolute NRBCs 0.0     LACTIC ACID WHOLE BLOOD - Normal    Lactic Acid 1.8     INFLUENZA A/B & SARS-COV2 PCR MULTIPLEX - Normal    Influenza A PCR Negative      Influenza B PCR Negative      RSV PCR Negative      SARS CoV2 PCR Negative     BLOOD CULTURE   BLOOD CULTURE      Emergency  Department Course:     Reviewed:  I reviewed nursing notes, vitals, past medical history and Care Everywhere    Assessments:  1605 I obtained history and examined the patient as noted above.   1810 I rechecked the patient and explained findings. He is feeling well.     Interventions:  1606 NS 1 L IV  1616 NS 1 L IV    Disposition:  The patient was discharged to home.     Impression & Plan     Medical Decision Making:  This patient is a pleasant 89-year-old male who presents with transient hypotension and some transient confusion.  He otherwise now feels well.  He did have several lower blood pressures today in the 70s over 40s.  Family endorses normal blood pressure is only 90 systolic.  Yesterday he had some transient confusion but this is completely resolved.  He now he feels at his baseline.  Infectious work-up was unremarkable.  Electrolytes and white count were unremarkable.  Urinalysis was negative.  Blood pressure has rebounded nicely and is now within his normal range.  Given that he is feeling at his baseline, his family feels he is at a baseline, I think is reasonable for him to be discharged home.  They feel comfortable with this plan.    Diagnosis:    ICD-10-CM    1. Transient hypotension  I95.9      Scribe Disclosure:  I, Cristian Falcon, am serving as a scribe at 4:05 PM on 5/27/2022 to document services personally performed by Hilda Schilling based on my observations and the provider's statements to me.        Hilda Schilling MD  05/27/22 1817

## 2022-05-27 NOTE — ED TRIAGE NOTES
Patient brought in by daughter for confusion and low blood pressure. BP at assisted living 70 systolic. Confusion and lethargic started last night. ABC intact alert and no distress.     Triage Assessment     Row Name 05/27/22 1521       Triage Assessment (Adult)    Airway WDL WDL       Respiratory WDL    Respiratory WDL WDL       Skin Circulation/Temperature WDL    Skin Circulation/Temperature WDL WDL

## 2022-05-31 LAB
ATRIAL RATE - MUSE: 80 BPM
DIASTOLIC BLOOD PRESSURE - MUSE: NORMAL MMHG
INTERPRETATION ECG - MUSE: NORMAL
P AXIS - MUSE: 49 DEGREES
PR INTERVAL - MUSE: 166 MS
QRS DURATION - MUSE: 94 MS
QT - MUSE: 414 MS
QTC - MUSE: 477 MS
R AXIS - MUSE: -14 DEGREES
SYSTOLIC BLOOD PRESSURE - MUSE: NORMAL MMHG
T AXIS - MUSE: -1 DEGREES
VENTRICULAR RATE- MUSE: 80 BPM

## 2022-06-01 ENCOUNTER — DOCUMENTATION ONLY (OUTPATIENT)
Dept: OTHER | Facility: CLINIC | Age: 87
End: 2022-06-01
Payer: MEDICARE

## 2022-06-01 LAB
BACTERIA BLD CULT: NO GROWTH
BACTERIA BLD CULT: NO GROWTH

## 2022-07-10 ENCOUNTER — HEALTH MAINTENANCE LETTER (OUTPATIENT)
Age: 87
End: 2022-07-10

## 2022-09-11 ENCOUNTER — HEALTH MAINTENANCE LETTER (OUTPATIENT)
Age: 87
End: 2022-09-11

## 2022-10-10 NOTE — TELEPHONE ENCOUNTER
Received a call from Chandana at [facility] [phone].    Pt was dxed with covid yesterday.  Family is interested for pt to be on paxlovid.  Advised how to sign up for a virtual visit step by step via [employer]  Advised if the pt and family had problems they could call [phone], but [employer] is usually the quickest way.  Advised this needs to be started as soon as possible within 5 days.      Will advise Infection Prevention.

## 2023-01-01 ENCOUNTER — HEALTH MAINTENANCE LETTER (OUTPATIENT)
Age: 88
End: 2023-01-01